# Patient Record
Sex: FEMALE | Race: WHITE | Employment: PART TIME | URBAN - METROPOLITAN AREA
[De-identification: names, ages, dates, MRNs, and addresses within clinical notes are randomized per-mention and may not be internally consistent; named-entity substitution may affect disease eponyms.]

---

## 2018-11-05 ENCOUNTER — HOSPITAL ENCOUNTER (OUTPATIENT)
Facility: MEDICAL CENTER | Age: 33
End: 2018-11-05
Attending: PHYSICIAN ASSISTANT
Payer: COMMERCIAL

## 2018-11-05 PROCEDURE — 88175 CYTOPATH C/V AUTO FLUID REDO: CPT

## 2018-11-05 PROCEDURE — 87624 HPV HI-RISK TYP POOLED RSLT: CPT

## 2018-11-06 LAB
CYTOLOGY REG CYTOL: ABNORMAL
HPV HR 12 DNA CVX QL NAA+PROBE: POSITIVE
HPV16 DNA SPEC QL NAA+PROBE: NEGATIVE
HPV18 DNA SPEC QL NAA+PROBE: NEGATIVE
SPECIMEN SOURCE: ABNORMAL

## 2018-11-18 NOTE — PROGRESS NOTES
Patient referral and medical records reviewed and analyzed 11/18 and 19  Counseling and testing materials prepared    25 minutes    [id number]

## 2018-11-20 ENCOUNTER — OFFICE VISIT (OUTPATIENT)
Dept: HEMATOLOGY ONCOLOGY | Facility: MEDICAL CENTER | Age: 33
End: 2018-11-20
Payer: COMMERCIAL

## 2018-11-20 VITALS
BODY MASS INDEX: 32.75 KG/M2 | RESPIRATION RATE: 16 BRPM | DIASTOLIC BLOOD PRESSURE: 70 MMHG | TEMPERATURE: 98.6 F | OXYGEN SATURATION: 95 % | HEART RATE: 83 BPM | HEIGHT: 63 IN | WEIGHT: 184.86 LBS | SYSTOLIC BLOOD PRESSURE: 100 MMHG

## 2018-11-20 DIAGNOSIS — Z80.3 FAMILY HISTORY OF BREAST CANCER: ICD-10-CM

## 2018-11-20 PROCEDURE — 99241 PR OFFICE CONSULTATION,LEVEL I: CPT | Performed by: MEDICAL GENETICS

## 2018-11-20 ASSESSMENT — PAIN SCALES - GENERAL: PAINLEVEL: NO PAIN

## 2018-11-21 NOTE — PROGRESS NOTES
November 20, 2018  Rossi HOBBS  Re: Phuong Crocker  Dear Ms. Wilson:  I had the opportunity to meet with your patient Phuong Crocker in my office today.  As you remember, Phuong was referred for evaluation and management with a family history of breast cancer (mother, diagnosed at age 39). Phuong’s mother was seen at [facility] and a germline molecular genotype was performed. Phuong brought with her the results of her mother’s test: Variants of Uncertain Significance were found in BRCA2 (c.8633-4T>A) and BARD1 (c.716T>A). There were no pathologic variants found in Phuong’s mother’s test (reported 3/21/16).  As you know, VUS’s are common and, by definition, have not been categorized as to their pathogenicity. They are NOT to be acted on until reclassified. They are, by definition, “Inconclusive.”  I have asked Phuong to request that her mother contact a genetic counselor at [facility] to inquire whether these VUS’s have been reclassified.   No testing for Phuong is indicated at this time. I agree with your management plan for Phuong to include baseline mammography.  Thank you for your support  Sincerely yours,  THO Hyman MD PhD     [id number]

## 2018-12-27 ENCOUNTER — HOSPITAL ENCOUNTER (OUTPATIENT)
Dept: LAB | Facility: MEDICAL CENTER | Age: 33
End: 2018-12-27
Attending: PHYSICIAN ASSISTANT
Payer: COMMERCIAL

## 2018-12-27 LAB — PATHOLOGY CONSULT NOTE: NORMAL

## 2018-12-27 PROCEDURE — 88305 TISSUE EXAM BY PATHOLOGIST: CPT

## 2019-03-15 ENCOUNTER — OFFICE VISIT (OUTPATIENT)
Dept: URGENT CARE | Facility: PHYSICIAN GROUP | Age: 34
End: 2019-03-15
Payer: COMMERCIAL

## 2019-03-15 VITALS
OXYGEN SATURATION: 95 % | BODY MASS INDEX: 33.49 KG/M2 | WEIGHT: 189 LBS | TEMPERATURE: 97.7 F | HEIGHT: 63 IN | SYSTOLIC BLOOD PRESSURE: 102 MMHG | HEART RATE: 74 BPM | DIASTOLIC BLOOD PRESSURE: 72 MMHG | RESPIRATION RATE: 16 BRPM

## 2019-03-15 DIAGNOSIS — N83.201 CYST OF RIGHT OVARY: ICD-10-CM

## 2019-03-15 LAB
APPEARANCE UR: CLEAR
BILIRUB UR STRIP-MCNC: NEGATIVE MG/DL
COLOR UR AUTO: YELLOW
GLUCOSE UR STRIP.AUTO-MCNC: NEGATIVE MG/DL
INT CON NEG: NEGATIVE
INT CON POS: POSITIVE
KETONES UR STRIP.AUTO-MCNC: NEGATIVE MG/DL
LEUKOCYTE ESTERASE UR QL STRIP.AUTO: NEGATIVE
NITRITE UR QL STRIP.AUTO: NEGATIVE
PH UR STRIP.AUTO: 6 [PH] (ref 5–8)
POC URINE PREGNANCY TEST: NEGATIVE
PROT UR QL STRIP: NEGATIVE MG/DL
RBC UR QL AUTO: NEGATIVE
SP GR UR STRIP.AUTO: 1.01
UROBILINOGEN UR STRIP-MCNC: 0.2 MG/DL

## 2019-03-15 PROCEDURE — 99203 OFFICE O/P NEW LOW 30 MIN: CPT | Performed by: PHYSICIAN ASSISTANT

## 2019-03-15 PROCEDURE — 81025 URINE PREGNANCY TEST: CPT | Performed by: PHYSICIAN ASSISTANT

## 2019-03-15 PROCEDURE — 81002 URINALYSIS NONAUTO W/O SCOPE: CPT | Performed by: PHYSICIAN ASSISTANT

## 2019-03-15 NOTE — PROGRESS NOTES
"Chief Complaint   Patient presents with   • RLQ Pain     Lower abdominal pain x2days        HISTORY OF PRESENT ILLNESS: Patient is a 33 y.o. female who presents today for 2 days of RLQ pain of waxing and waning quality. Sometimes dull and sometimes sharp. Positional changes seem to make it better or worse, sitting up being the most painful.   No vaginal bleeding or discharge.  No nausea.  She has had normal appetite.   She has had a  but no other abdominal surgeries.  No urinary symptoms but notes that there was some relief of pain after a BM and after urination.   She and  are trying to get pregnant but are not using anything other than family planning method right now.   OTC with mild relief.     There are no active problems to display for this patient.      Allergies:Patient has no known allergies.    No current [facility]-ordered outpatient prescriptions on file.     No current [facility]-ordered facility-administered medications on file.        History reviewed. No pertinent past medical history.    Social History   Substance Use Topics   • Smoking status: Current Some Day Smoker   • Smokeless tobacco: Never Used   • Alcohol use Yes      Comment: Maikol       No family status information on file.   History reviewed. No pertinent family history.    ROS:  Review of Systems   Constitutional: Negative for fever, chills, weight loss and malaise/fatigue.   HENT: Negative for ear pain, nosebleeds, congestion, sore throat and neck pain.    Eyes: Negative for blurred vision.   Respiratory: Negative for cough, sputum production, shortness of breath and wheezing.    Cardiovascular: Negative for chest pain, palpitations, orthopnea and leg swelling.   Gastrointestinal: SEE HPI  Genitourinary: SEE HPI    Exam:  Blood pressure 102/72, pulse 74, temperature 36.5 °C (97.7 °F), temperature source Temporal, resp. rate 16, height 1.6 m (5' 3\"), weight 85.7 kg (189 lb), SpO2 95 %, not currently breastfeeding.  General:  Well " nourished, well developed female in NAD  Eyes: PERRLA, EOM within normal limits, no conjunctival injection, no scleral icterus, visual fields and acuity grossly intact.  Mouth: reasonable hygiene, no erythema exudates or tonsillar enlargement.  Neck: no masses, range of motion within normal limits, no tracheal deviation. No lymphadenopathy  Pulmonary: Normal respiratory effort, no wheezes, crackles, or rhonchi.  Cardiovascular: regular rate and rhythm without murmurs, rubs, or gallops.  Abdomen: Soft, mild right LQ tender to deep palpation, nondistended. Normal bowel sounds. No hepatosplenomegaly or masses, or hernias. No rebound or guarding.  Negative psoas and obturator signs.   Skin: No visible rashes or lesion. Warm, pink, dry.   Extremities: no clubbing, cyanosis, or edema.  Neuro: A&O x 3. Speech normal/clear.  Normal gait.       Component Results     Component Value Ref Range & Units Status   POC Color YELLOW  Negative Final   POC Appearance CLEAR  Negative Final   POC Leukocyte Esterase NEGATIVE  Negative Final   POC Nitrites NEGATIVE  Negative Final   POC Urobiligen 0.2  Negative (0.2) mg/dL Final   POC Protein NEGATIVE  Negative mg/dL Final   POC Urine PH 6.0  5.0 - 8.0 Final   POC Blood NEGATIVE  Negative Final   POC Specific Gravity 1.010  <1.005 - >1.030 Final   POC Ketones NEGATIVE  Negative mg/dL Final   POC Bilirubin NEGATIVE  Negative mg/dL Final   POC Glucose NEGATIVE  Negative mg/dL      Impression       1.  2 right ovarian cysts which contain internal echoes likely representing hemorrhagic cysts. These measure 4.6 x 3.4 x 3.2 and 3.4 x 3.1 x 3.1 cm in size. Follow-up ultrasound in 6 weeks is recommended for further evaluation.    2.  Small amount of free fluid dependently within the pelvis   Reading Provider Reading Date   Eric Ackerman M.D. Mar 16, 2019       Assessment/Plan:  1. Cyst of right ovary  POCT Pregnancy    POCT Urinalysis    US-PELVIC COMPLETE (TRANSABDOMINAL/TRANSVAGINAL) (COMBO)     REFERRAL TO OB/GYN          -RAD as above.    -speaking with patient following day, 03/16/19 she notes her pain has improved. Suspect regression/resolution naturally however do recommend follow up as does RAD.   -she goes to [facility] and referral to follow up was placed.   -heating pad, NSAIDs prn.   -RTC precautions/ER precautions.         Supportive care, differential diagnoses, and indications for immediate follow-up discussed with patient.   Pathogenesis of diagnosis discussed including typical length and natural progression.   Instructed to return to clinic or nearest emergency department for any change in condition, further concerns, or worsening of symptoms.  Patient states understanding of the plan of care and discharge instructions.        Divine Castillo P.A.-C.

## 2019-03-16 ENCOUNTER — HOSPITAL ENCOUNTER (OUTPATIENT)
Dept: RADIOLOGY | Facility: MEDICAL CENTER | Age: 34
End: 2019-03-16
Attending: PHYSICIAN ASSISTANT
Payer: COMMERCIAL

## 2019-03-16 DIAGNOSIS — R10.31 RLQ ABDOMINAL PAIN: ICD-10-CM

## 2019-03-16 PROCEDURE — 76830 TRANSVAGINAL US NON-OB: CPT

## 2019-04-06 ENCOUNTER — HOSPITAL ENCOUNTER (OUTPATIENT)
Dept: RADIOLOGY | Facility: MEDICAL CENTER | Age: 34
End: 2019-04-06
Attending: PHYSICIAN ASSISTANT
Payer: COMMERCIAL

## 2019-04-06 DIAGNOSIS — Z80.3 FAMILY HISTORY OF MALIGNANT NEOPLASM OF BREAST: ICD-10-CM

## 2019-04-06 DIAGNOSIS — Z12.31 VISIT FOR SCREENING MAMMOGRAM: ICD-10-CM

## 2019-04-25 ENCOUNTER — HOSPITAL ENCOUNTER (OUTPATIENT)
Dept: RADIOLOGY | Facility: MEDICAL CENTER | Age: 34
End: 2019-04-25
Attending: PHYSICIAN ASSISTANT
Payer: COMMERCIAL

## 2019-04-25 PROCEDURE — 77063 BREAST TOMOSYNTHESIS BI: CPT

## 2019-05-04 ENCOUNTER — HOSPITAL ENCOUNTER (OUTPATIENT)
Dept: RADIOLOGY | Facility: MEDICAL CENTER | Age: 34
End: 2019-05-04
Attending: OBSTETRICS & GYNECOLOGY
Payer: COMMERCIAL

## 2019-05-04 DIAGNOSIS — N83.201 CYST OF RIGHT OVARY: ICD-10-CM

## 2019-05-04 PROCEDURE — 76830 TRANSVAGINAL US NON-OB: CPT

## 2019-05-21 ENCOUNTER — OFFICE VISIT (OUTPATIENT)
Dept: URGENT CARE | Facility: PHYSICIAN GROUP | Age: 34
End: 2019-05-21
Payer: COMMERCIAL

## 2019-05-21 VITALS
HEIGHT: 63 IN | RESPIRATION RATE: 18 BRPM | OXYGEN SATURATION: 98 % | WEIGHT: 185 LBS | TEMPERATURE: 97.5 F | SYSTOLIC BLOOD PRESSURE: 132 MMHG | BODY MASS INDEX: 32.78 KG/M2 | HEART RATE: 82 BPM | DIASTOLIC BLOOD PRESSURE: 82 MMHG

## 2019-05-21 DIAGNOSIS — N61.0 MASTITIS: ICD-10-CM

## 2019-05-21 PROCEDURE — 99214 OFFICE O/P EST MOD 30 MIN: CPT | Performed by: FAMILY MEDICINE

## 2019-05-21 RX ORDER — AMOXICILLIN AND CLAVULANATE POTASSIUM 875; 125 MG/1; MG/1
1 TABLET, FILM COATED ORAL 2 TIMES DAILY
Qty: 14 TAB | Refills: 0 | Status: SHIPPED | OUTPATIENT
Start: 2019-05-21 | End: 2019-05-28

## 2019-05-25 NOTE — PROGRESS NOTES
"SUBJECTIVE      Chief Complaint   Patient presents with   • Nodule     R [id number]                Rash  This is a new problem. The problem has been gradually worsening since onset. Pain location: rt breast - 2 d ago.     The rash is characterized by redness, swelling and pain.   Area is TTP.    Pain is \"soreness\" and \"constant\".     Pt was exposed to nothing. Pertinent negatives include no congestion, cough, fatigue, fever or shortness of breath. Past treatments include nothing.     History   Substance Use Topics   • Smoking status: Never Smoker    • Smokeless tobacco: No    • Alcohol Use: No      Past medical history was unremarkable and not pertinent to current issue       Family history was reviewed and not pertinent           Review of Systems   Constitutional: Negative for fever, chills and malaise/fatigue.   Eyes: Negative for vision changes, d/c.    Respiratory: Negative for cough and sputum production.    Cardiovascular: Negative for chest pain and palpitations.   Gastrointestinal: Negative for nausea, vomiting, abdominal pain, diarrhea and constipation.   Genitourinary: Negative for dysuria, urgency and frequency.   Skin: Negative for rash or  itching.   Neurological: Negative for dizziness and tingling.   Psychiatric/Behavioral: Negative for depression.   Hematologic/lymphatic - denies bruising or excessive bleeding  All other systems reviewed and are negative.            Objective:     /82   Pulse 82   Temp 36.4 °C (97.5 °F) (Temporal)   Resp 18   Ht 1.6 m (5' 3\")   Wt 83.9 kg (185 lb)   SpO2 98%       Physical Exam   Constitutional: pt is oriented to person, place, and time. Pt appears well-developed and well-nourished. No distress.   HENT:   Head: Normocephalic and atraumatic.   Eyes: Conjunctivae are normal. No scleral icterus.   Cardiovascular: Normal rate and regular rhythm.    Pulmonary/Chest: Effort normal and breath sounds normal. No respiratory distress.    rt breast - there is 2 " x3 area of redness, warmth and TTP at outer, lower quad    Neurological: pt is alert and oriented to person, place, and time. No cranial nerve deficit.   Skin: Skin is warm. Pt is not diaphoretic.      Area of erythema, warmth, tender to touch over       Nursing note and vitals reviewed.              Assessment/Plan:     1. Mastitis     - amoxicillin-clavulanate (AUGMENTIN) 875-125 MG Tab; Take 1 Tab by mouth 2 times a day for 7 days.  Dispense: 14 Tab; Refill: 0    Follow up in one week if no improvement, sooner if symptoms worsen.

## 2019-06-14 ENCOUNTER — TELEPHONE (OUTPATIENT)
Dept: RADIOLOGY | Facility: MEDICAL CENTER | Age: 34
End: 2019-06-14

## 2019-06-14 NOTE — TELEPHONE ENCOUNTER
LM REQUESTING REVISED ORDER FOR ULTRASOUND AND/OR DIAGNOSTIC MAMMOGRAM (IF INDICATED); CURRENTLY HAVE US ORDER ONLY/TML

## 2019-06-17 ENCOUNTER — HOSPITAL ENCOUNTER (OUTPATIENT)
Dept: RADIOLOGY | Facility: MEDICAL CENTER | Age: 34
End: 2019-06-17
Attending: PHYSICIAN ASSISTANT
Payer: COMMERCIAL

## 2019-06-17 DIAGNOSIS — N64.9 BREAST LESION: ICD-10-CM

## 2019-06-17 PROCEDURE — 76642 ULTRASOUND BREAST LIMITED: CPT | Mod: RT

## 2019-07-12 ENCOUNTER — TELEPHONE (OUTPATIENT)
Dept: RADIOLOGY | Facility: MEDICAL CENTER | Age: 34
End: 2019-07-12

## 2019-07-12 NOTE — TELEPHONE ENCOUNTER
LM TO ADVISE WE ARE WAITING FOR CORRECTED ORDER FROM DR GHADA BROUSSARD OFFICE; ORIGINALLY SENT BILAT MARITAG/US; PT NEEDS UNI R DIAG W/US/TML

## 2019-07-15 ENCOUNTER — HOSPITAL ENCOUNTER (OUTPATIENT)
Dept: RADIOLOGY | Facility: MEDICAL CENTER | Age: 34
End: 2019-07-15
Attending: PHYSICIAN ASSISTANT
Payer: COMMERCIAL

## 2019-07-15 DIAGNOSIS — N64.9 LESION OF BREAST: ICD-10-CM

## 2019-07-15 PROCEDURE — 76642 ULTRASOUND BREAST LIMITED: CPT | Mod: RT

## 2019-07-16 ENCOUNTER — HOSPITAL ENCOUNTER (OUTPATIENT)
Dept: LAB | Facility: MEDICAL CENTER | Age: 34
End: 2019-07-16
Attending: PHYSICIAN ASSISTANT
Payer: COMMERCIAL

## 2019-07-16 PROCEDURE — 88175 CYTOPATH C/V AUTO FLUID REDO: CPT

## 2019-07-16 PROCEDURE — 87624 HPV HI-RISK TYP POOLED RSLT: CPT

## 2020-01-24 ENCOUNTER — HOSPITAL ENCOUNTER (OUTPATIENT)
Dept: LAB | Facility: MEDICAL CENTER | Age: 35
End: 2020-01-24
Attending: PHYSICIAN ASSISTANT
Payer: COMMERCIAL

## 2020-01-24 PROCEDURE — 88175 CYTOPATH C/V AUTO FLUID REDO: CPT

## 2020-01-24 PROCEDURE — 87624 HPV HI-RISK TYP POOLED RSLT: CPT

## 2020-03-10 ENCOUNTER — HOSPITAL ENCOUNTER (OUTPATIENT)
Dept: LAB | Facility: MEDICAL CENTER | Age: 35
End: 2020-03-10
Attending: PHYSICIAN ASSISTANT
Payer: COMMERCIAL

## 2020-03-10 LAB — PATHOLOGY CONSULT NOTE: NORMAL

## 2020-03-10 PROCEDURE — 88305 TISSUE EXAM BY PATHOLOGIST: CPT | Mod: 59

## 2020-08-07 ENCOUNTER — OFFICE VISIT (OUTPATIENT)
Dept: URGENT CARE | Facility: PHYSICIAN GROUP | Age: 35
End: 2020-08-07
Payer: COMMERCIAL

## 2020-08-07 VITALS
TEMPERATURE: 98.1 F | DIASTOLIC BLOOD PRESSURE: 80 MMHG | RESPIRATION RATE: 14 BRPM | OXYGEN SATURATION: 97 % | HEART RATE: 67 BPM | WEIGHT: 178 LBS | HEIGHT: 63 IN | BODY MASS INDEX: 31.54 KG/M2 | SYSTOLIC BLOOD PRESSURE: 128 MMHG
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
## 2020-08-07 PROCEDURE — 99214 OFFICE O/P EST MOD 30 MIN: CPT | Performed by: PHYSICIAN ASSISTANT

## 2020-08-07 RX ORDER — METHYLPREDNISOLONE 4 MG/1
TABLET ORAL
Qty: 21 TAB | Refills: 0 | Status: SHIPPED | OUTPATIENT
Start: 2020-08-07 | End: 2022-11-30

## 2020-08-07 ASSESSMENT — ENCOUNTER SYMPTOMS
HEADACHES: 0
VOMITING: 0
SORE THROAT: 0
EYE REDNESS: 0
NAUSEA: 0
EYE DISCHARGE: 0
COUGH: 0
SHORTNESS OF BREATH: 0
FEVER: 0

## 2020-08-07 NOTE — PROGRESS NOTES
Subjective:      Tina Crocker is a 34 y.o. female who presents with Bug Bite (on 8/04/20  swollen and tingling in arm )            HPI  This is a new problem.   The patient presents to clinic c/o a bug bite to her right upper arm.  The patient states she was in her backyard x2 days ago when she sustained a bug bite to her right upper arm.  The patient is unsure of the type of bug/insect that caused the pain.  The patient states she developed a red welt to the affected area with associated itching.  The patient states the next day she developed increased swelling and redness to the affected area of the right upper arm.  The patient was concerned about possible infection.  The patient states she started taking leftover doxycycline for her current symptoms.  The patient states she took approximately 4 doses of the left upper antibiotic.  Patient states the swelling and redness improved.  The patient reports continued itching to the affected area.  She reports no associated fever.  No discharge/drainage.  The patient has taken Benadryl for her current symptoms.    PMH:  has no past medical history on file.  MEDS: No current outpatient medications on file.  ALLERGIES: Not on File  SURGHX: No past surgical history on file.  SOCHX:  reports that she has been smoking cigarettes. She has never used smokeless tobacco. She reports current alcohol use. She reports that she does not use drugs.  FH: Family history was reviewed, no pertinent findings to report    Review of Systems   Constitutional: Negative for fever.   HENT: Negative for congestion, ear pain and sore throat.    Eyes: Negative for discharge and redness.   Respiratory: Negative for cough and shortness of breath.    Cardiovascular: Negative for chest pain and leg swelling.   Gastrointestinal: Negative for nausea and vomiting.   Musculoskeletal: Negative for joint pain.   Skin: Negative for rash.   Neurological: Negative for headaches.          Objective:     BP  "128/80 (BP Location: Left arm, Patient Position: Sitting, BP Cuff Size: Adult)   Pulse 67   Temp 36.7 °C (98.1 °F) (Temporal)   Resp 14   Ht 1.6 m (5' 3\")   Wt 80.7 kg (178 lb)   SpO2 97%   BMI 31.53 kg/m²      Physical Exam  Constitutional:       General: She is not in acute distress.     Appearance: Normal appearance. She is well-developed.   HENT:      Head: Normocephalic and atraumatic.      Right Ear: External ear normal.      Left Ear: External ear normal.      Nose: Nose normal.   Eyes:      Extraocular Movements: Extraocular movements intact.      Conjunctiva/sclera: Conjunctivae normal.   Neck:      Musculoskeletal: Normal range of motion and neck supple.   Cardiovascular:      Rate and Rhythm: Normal rate.   Pulmonary:      Effort: Pulmonary effort is normal.   Musculoskeletal: Normal range of motion.   Skin:     General: Skin is warm and dry.             Comments:   Right Upper Arm:  Possible insect bite to the medial aspect of the right upper arm with 2 punctate lesions. No tenderness to palpation.  No swelling.  No overlying erythema.  No increased warmth.  No ecchymosis.  No induration.  No palpable fluctuance.  No signs of lymphangitis.  No discharge/drainage.   Neurological:      Mental Status: She is alert and oriented to person, place, and time.                 Assessment/Plan:        1. Bug bite, initial encounter  - methylPREDNISolone (MEDROL DOSEPAK) 4 MG Tablet Therapy Pack; Follow schedule on package instructions.  Dispense: 21 Tab; Refill: 0    The patient's presenting symptoms and physical exam findings are consistent with a bug bite of the right upper extremity.  On physical exam, patient had a possible insect bite to the medial aspect of the right upper arm with 2 punching lesions.  No tenderness to palpation, swelling, overlying erythema, increased warmth, ecchymosis, induration, palpable fluctuance, discharge/drainage, or signs of lymphangitis were appreciated.  The patient " reports continued itching to the area.  Discussed likely inflammatory response versus infectious process with the patient.  Advised the patient her symptoms are likely due to inflammatory process.  Informed the patient to discontinue the Doxycycline.  Will prescribe patient a Medrol Dosepak for her current symptoms.  Advised the patient of the associated side effects of oral steroids, including immunosuppression.  Recommend OTC medications and supportive care for symptomatic management.  Recommend patient follow-up with her PCP.  Discussed return precautions with the patient, and she verbalized understanding.    Differential diagnoses, supportive care, and indications for immediate follow-up discussed with patient.   Instructed to return to clinic or nearest emergency department for any change in condition, further concerns, or worsening of symptoms.    OTC Tylenol or Motrin for fever/discomfort.  OTC antihistamines for symptomatic relief  OTC anti-itch cream for symptomatic relief  OTC topical steroid cream for symptomatic relief  Monitor for signs of infection  Follow-up with PCP  Return to clinic or go to the ED if symptoms worsen or fail to improve, or if patient develop worsening/increasing/persistent pain/tenderness to the affected area, swelling, increased redness or warmth, discharge/drainage, increased itching, radiation of pain, numbness, tingling, weakness, fever/chills, secondary signs of infection, and/or any concerning symptoms.    Discussed plan with the patient, and she agrees to the above.     Please note that this dictation was created using voice recognition software. I have made every reasonable attempt to correct obvious errors, but I expect that there may be errors of grammar and possibly content that I did not discover before finalizing the note.

## 2020-09-21 ENCOUNTER — HOSPITAL ENCOUNTER (OUTPATIENT)
Dept: LAB | Facility: MEDICAL CENTER | Age: 35
End: 2020-09-21
Attending: PHYSICIAN ASSISTANT
Payer: COMMERCIAL

## 2020-09-21 PROCEDURE — 88175 CYTOPATH C/V AUTO FLUID REDO: CPT

## 2020-09-21 PROCEDURE — 87624 HPV HI-RISK TYP POOLED RSLT: CPT

## 2020-12-03 ENCOUNTER — HOSPITAL ENCOUNTER (OUTPATIENT)
Dept: LAB | Facility: MEDICAL CENTER | Age: 35
End: 2020-12-03
Attending: OBSTETRICS & GYNECOLOGY
Payer: COMMERCIAL

## 2020-12-03 LAB — PATHOLOGY CONSULT NOTE: NORMAL

## 2020-12-03 PROCEDURE — 88307 TISSUE EXAM BY PATHOLOGIST: CPT | Mod: 59

## 2021-02-04 ENCOUNTER — HOSPITAL ENCOUNTER (OUTPATIENT)
Dept: RADIOLOGY | Facility: MEDICAL CENTER | Age: 36
End: 2021-02-04
Attending: PHYSICIAN ASSISTANT
Payer: COMMERCIAL

## 2021-02-04 DIAGNOSIS — N83.209 CYST OF OVARY, UNSPECIFIED LATERALITY: ICD-10-CM

## 2021-02-04 PROCEDURE — 76830 TRANSVAGINAL US NON-OB: CPT

## 2021-06-16 ENCOUNTER — HOSPITAL ENCOUNTER (OUTPATIENT)
Facility: MEDICAL CENTER | Age: 36
End: 2021-06-16
Attending: PHYSICIAN ASSISTANT
Payer: COMMERCIAL

## 2021-06-16 PROCEDURE — 88175 CYTOPATH C/V AUTO FLUID REDO: CPT

## 2021-06-16 PROCEDURE — 87624 HPV HI-RISK TYP POOLED RSLT: CPT

## 2021-06-17 LAB
CYTOLOGY REG CYTOL: NORMAL
HPV HR 12 DNA CVX QL NAA+PROBE: NEGATIVE
HPV16 DNA SPEC QL NAA+PROBE: NEGATIVE
HPV18 DNA SPEC QL NAA+PROBE: NEGATIVE
SPECIMEN SOURCE: NORMAL

## 2022-07-05 ENCOUNTER — HOSPITAL ENCOUNTER (OUTPATIENT)
Dept: LAB | Facility: MEDICAL CENTER | Age: 37
End: 2022-07-05
Attending: PHYSICIAN ASSISTANT
Payer: COMMERCIAL

## 2022-07-05 PROCEDURE — 88175 CYTOPATH C/V AUTO FLUID REDO: CPT

## 2022-07-07 LAB — CYTOLOGY REG CYTOL: NORMAL

## 2022-11-30 ENCOUNTER — PRE-ADMISSION TESTING (OUTPATIENT)
Dept: ADMISSIONS | Facility: MEDICAL CENTER | Age: 37
End: 2022-11-30
Attending: OBSTETRICS & GYNECOLOGY
Payer: COMMERCIAL

## 2022-11-30 DIAGNOSIS — Z01.812 PRE-OPERATIVE LABORATORY EXAMINATION: ICD-10-CM

## 2022-11-30 LAB
ANION GAP SERPL CALC-SCNC: 14 MMOL/L (ref 7–16)
B-HCG SERPL-ACNC: <1 MIU/ML (ref 0–5)
BUN SERPL-MCNC: 14 MG/DL (ref 8–22)
CALCIUM SERPL-MCNC: 10.3 MG/DL (ref 8.5–10.5)
CHLORIDE SERPL-SCNC: 103 MMOL/L (ref 96–112)
CO2 SERPL-SCNC: 25 MMOL/L (ref 20–33)
CREAT SERPL-MCNC: 0.83 MG/DL (ref 0.5–1.4)
ERYTHROCYTE [DISTWIDTH] IN BLOOD BY AUTOMATED COUNT: 43 FL (ref 35.9–50)
GFR SERPLBLD CREATININE-BSD FMLA CKD-EPI: 93 ML/MIN/1.73 M 2
GLUCOSE SERPL-MCNC: 83 MG/DL (ref 65–99)
HCT VFR BLD AUTO: 41.1 % (ref 37–47)
HGB BLD-MCNC: 13.5 G/DL (ref 12–16)
MCH RBC QN AUTO: 29.3 PG (ref 27–33)
MCHC RBC AUTO-ENTMCNC: 32.8 G/DL (ref 33.6–35)
MCV RBC AUTO: 89.3 FL (ref 81.4–97.8)
PLATELET # BLD AUTO: 214 K/UL (ref 164–446)
PMV BLD AUTO: 13.5 FL (ref 9–12.9)
POTASSIUM SERPL-SCNC: 4 MMOL/L (ref 3.6–5.5)
RBC # BLD AUTO: 4.6 M/UL (ref 4.2–5.4)
SODIUM SERPL-SCNC: 142 MMOL/L (ref 135–145)
WBC # BLD AUTO: 8.3 K/UL (ref 4.8–10.8)

## 2022-11-30 PROCEDURE — 80048 BASIC METABOLIC PNL TOTAL CA: CPT

## 2022-11-30 PROCEDURE — 36415 COLL VENOUS BLD VENIPUNCTURE: CPT

## 2022-11-30 PROCEDURE — 85027 COMPLETE CBC AUTOMATED: CPT

## 2022-11-30 PROCEDURE — 84702 CHORIONIC GONADOTROPIN TEST: CPT

## 2022-12-09 ENCOUNTER — ANESTHESIA EVENT (OUTPATIENT)
Dept: SURGERY | Facility: MEDICAL CENTER | Age: 37
End: 2022-12-09
Payer: COMMERCIAL

## 2022-12-09 ENCOUNTER — HOSPITAL ENCOUNTER (OUTPATIENT)
Facility: MEDICAL CENTER | Age: 37
End: 2022-12-09
Attending: OBSTETRICS & GYNECOLOGY | Admitting: OBSTETRICS & GYNECOLOGY
Payer: COMMERCIAL

## 2022-12-09 ENCOUNTER — ANESTHESIA (OUTPATIENT)
Dept: SURGERY | Facility: MEDICAL CENTER | Age: 37
End: 2022-12-09
Payer: COMMERCIAL

## 2022-12-09 VITALS
BODY MASS INDEX: 30.08 KG/M2 | SYSTOLIC BLOOD PRESSURE: 116 MMHG | OXYGEN SATURATION: 94 % | TEMPERATURE: 97.4 F | RESPIRATION RATE: 16 BRPM | HEIGHT: 63 IN | WEIGHT: 169.75 LBS | DIASTOLIC BLOOD PRESSURE: 72 MMHG | HEART RATE: 67 BPM

## 2022-12-09 DIAGNOSIS — R11.0 POSTOPERATIVE NAUSEA: ICD-10-CM

## 2022-12-09 DIAGNOSIS — G89.18 POSTOPERATIVE PAIN: ICD-10-CM

## 2022-12-09 DIAGNOSIS — Z98.890 POSTOPERATIVE NAUSEA: ICD-10-CM

## 2022-12-09 PROBLEM — R10.2 PELVIC PAIN: Status: ACTIVE | Noted: 2022-12-09

## 2022-12-09 PROBLEM — N97.1: Status: ACTIVE | Noted: 2022-12-09

## 2022-12-09 PROBLEM — N73.6 PELVIC PERITONEAL ADHESIONS, FEMALE: Status: ACTIVE | Noted: 2022-12-09

## 2022-12-09 LAB — HCG UR QL: NEGATIVE

## 2022-12-09 PROCEDURE — 160009 HCHG ANES TIME/MIN: Performed by: OBSTETRICS & GYNECOLOGY

## 2022-12-09 PROCEDURE — 700102 HCHG RX REV CODE 250 W/ 637 OVERRIDE(OP): Performed by: OBSTETRICS & GYNECOLOGY

## 2022-12-09 PROCEDURE — 160035 HCHG PACU - 1ST 60 MINS PHASE I: Performed by: OBSTETRICS & GYNECOLOGY

## 2022-12-09 PROCEDURE — 160048 HCHG OR STATISTICAL LEVEL 1-5: Performed by: OBSTETRICS & GYNECOLOGY

## 2022-12-09 PROCEDURE — 700111 HCHG RX REV CODE 636 W/ 250 OVERRIDE (IP): Performed by: ANESTHESIOLOGY

## 2022-12-09 PROCEDURE — 00840 ANES IPER PX LOWER ABD NOS: CPT | Performed by: ANESTHESIOLOGY

## 2022-12-09 PROCEDURE — 160036 HCHG PACU - EA ADDL 30 MINS PHASE I: Performed by: OBSTETRICS & GYNECOLOGY

## 2022-12-09 PROCEDURE — A9270 NON-COVERED ITEM OR SERVICE: HCPCS | Performed by: OBSTETRICS & GYNECOLOGY

## 2022-12-09 PROCEDURE — 700101 HCHG RX REV CODE 250: Performed by: OBSTETRICS & GYNECOLOGY

## 2022-12-09 PROCEDURE — 160046 HCHG PACU - 1ST 60 MINS PHASE II: Performed by: OBSTETRICS & GYNECOLOGY

## 2022-12-09 PROCEDURE — 160025 RECOVERY II MINUTES (STATS): Performed by: OBSTETRICS & GYNECOLOGY

## 2022-12-09 PROCEDURE — 160028 HCHG SURGERY MINUTES - 1ST 30 MINS LEVEL 3: Performed by: OBSTETRICS & GYNECOLOGY

## 2022-12-09 PROCEDURE — 81025 URINE PREGNANCY TEST: CPT

## 2022-12-09 PROCEDURE — 700101 HCHG RX REV CODE 250: Performed by: ANESTHESIOLOGY

## 2022-12-09 PROCEDURE — 160039 HCHG SURGERY MINUTES - EA ADDL 1 MIN LEVEL 3: Performed by: OBSTETRICS & GYNECOLOGY

## 2022-12-09 PROCEDURE — 160002 HCHG RECOVERY MINUTES (STAT): Performed by: OBSTETRICS & GYNECOLOGY

## 2022-12-09 PROCEDURE — 700105 HCHG RX REV CODE 258: Performed by: OBSTETRICS & GYNECOLOGY

## 2022-12-09 PROCEDURE — 160047 HCHG PACU  - EA ADDL 30 MINS PHASE II: Performed by: OBSTETRICS & GYNECOLOGY

## 2022-12-09 RX ORDER — CELECOXIB 200 MG/1
400 CAPSULE ORAL ONCE
Status: COMPLETED | OUTPATIENT
Start: 2022-12-09 | End: 2022-12-09

## 2022-12-09 RX ORDER — PROMETHAZINE HYDROCHLORIDE 25 MG/1
12.5 SUPPOSITORY RECTAL EVERY 4 HOURS PRN
Status: DISCONTINUED | OUTPATIENT
Start: 2022-12-09 | End: 2022-12-09 | Stop reason: HOSPADM

## 2022-12-09 RX ORDER — HYDROMORPHONE HYDROCHLORIDE 1 MG/ML
0.2 INJECTION, SOLUTION INTRAMUSCULAR; INTRAVENOUS; SUBCUTANEOUS
Status: DISCONTINUED | OUTPATIENT
Start: 2022-12-09 | End: 2022-12-09 | Stop reason: HOSPADM

## 2022-12-09 RX ORDER — OXYCODONE HCL 5 MG/5 ML
10 SOLUTION, ORAL ORAL
Status: DISCONTINUED | OUTPATIENT
Start: 2022-12-09 | End: 2022-12-09 | Stop reason: HOSPADM

## 2022-12-09 RX ORDER — ACETAMINOPHEN 500 MG
1000 TABLET ORAL ONCE
Status: COMPLETED | OUTPATIENT
Start: 2022-12-09 | End: 2022-12-09

## 2022-12-09 RX ORDER — EPINEPHRINE 1 MG/ML(1)
AMPUL (ML) INJECTION
Status: DISCONTINUED
Start: 2022-12-09 | End: 2022-12-09 | Stop reason: HOSPADM

## 2022-12-09 RX ORDER — METOPROLOL TARTRATE 1 MG/ML
1 INJECTION, SOLUTION INTRAVENOUS
Status: DISCONTINUED | OUTPATIENT
Start: 2022-12-09 | End: 2022-12-09 | Stop reason: HOSPADM

## 2022-12-09 RX ORDER — PHENAZOPYRIDINE HYDROCHLORIDE 200 MG/1
200 TABLET, FILM COATED ORAL ONCE
Status: COMPLETED | OUTPATIENT
Start: 2022-12-09 | End: 2022-12-09

## 2022-12-09 RX ORDER — MIDAZOLAM HYDROCHLORIDE 1 MG/ML
INJECTION INTRAMUSCULAR; INTRAVENOUS PRN
Status: DISCONTINUED | OUTPATIENT
Start: 2022-12-09 | End: 2022-12-09 | Stop reason: SURG

## 2022-12-09 RX ORDER — ONDANSETRON 2 MG/ML
INJECTION INTRAMUSCULAR; INTRAVENOUS PRN
Status: DISCONTINUED | OUTPATIENT
Start: 2022-12-09 | End: 2022-12-09 | Stop reason: SURG

## 2022-12-09 RX ORDER — HYDROMORPHONE HYDROCHLORIDE 1 MG/ML
0.1 INJECTION, SOLUTION INTRAMUSCULAR; INTRAVENOUS; SUBCUTANEOUS
Status: DISCONTINUED | OUTPATIENT
Start: 2022-12-09 | End: 2022-12-09 | Stop reason: HOSPADM

## 2022-12-09 RX ORDER — BUPIVACAINE HYDROCHLORIDE AND EPINEPHRINE 2.5; 5 MG/ML; UG/ML
INJECTION, SOLUTION EPIDURAL; INFILTRATION; INTRACAUDAL; PERINEURAL
Status: DISCONTINUED | OUTPATIENT
Start: 2022-12-09 | End: 2022-12-09 | Stop reason: HOSPADM

## 2022-12-09 RX ORDER — ONDANSETRON 4 MG/1
4 TABLET, ORALLY DISINTEGRATING ORAL EVERY 6 HOURS PRN
Qty: 4 TABLET | Refills: 0 | Status: SHIPPED
Start: 2022-12-09 | End: 2022-12-10

## 2022-12-09 RX ORDER — GABAPENTIN 300 MG/1
600 CAPSULE ORAL ONCE
Status: COMPLETED | OUTPATIENT
Start: 2022-12-09 | End: 2022-12-09

## 2022-12-09 RX ORDER — HALOPERIDOL 5 MG/ML
1 INJECTION INTRAMUSCULAR
Status: DISCONTINUED | OUTPATIENT
Start: 2022-12-09 | End: 2022-12-09 | Stop reason: HOSPADM

## 2022-12-09 RX ORDER — LIDOCAINE HYDROCHLORIDE 20 MG/ML
INJECTION, SOLUTION EPIDURAL; INFILTRATION; INTRACAUDAL; PERINEURAL PRN
Status: DISCONTINUED | OUTPATIENT
Start: 2022-12-09 | End: 2022-12-09 | Stop reason: SURG

## 2022-12-09 RX ORDER — KETOROLAC TROMETHAMINE 30 MG/ML
INJECTION, SOLUTION INTRAMUSCULAR; INTRAVENOUS PRN
Status: DISCONTINUED | OUTPATIENT
Start: 2022-12-09 | End: 2022-12-09 | Stop reason: SURG

## 2022-12-09 RX ORDER — CEFAZOLIN SODIUM 1 G/3ML
INJECTION, POWDER, FOR SOLUTION INTRAMUSCULAR; INTRAVENOUS PRN
Status: DISCONTINUED | OUTPATIENT
Start: 2022-12-09 | End: 2022-12-09 | Stop reason: SURG

## 2022-12-09 RX ORDER — HYDRALAZINE HYDROCHLORIDE 20 MG/ML
5 INJECTION INTRAMUSCULAR; INTRAVENOUS
Status: DISCONTINUED | OUTPATIENT
Start: 2022-12-09 | End: 2022-12-09 | Stop reason: HOSPADM

## 2022-12-09 RX ORDER — BUPIVACAINE HYDROCHLORIDE 2.5 MG/ML
INJECTION, SOLUTION EPIDURAL; INFILTRATION; INTRACAUDAL
Status: DISCONTINUED
Start: 2022-12-09 | End: 2022-12-09 | Stop reason: HOSPADM

## 2022-12-09 RX ORDER — OXYCODONE HCL 5 MG/5 ML
5 SOLUTION, ORAL ORAL
Status: DISCONTINUED | OUTPATIENT
Start: 2022-12-09 | End: 2022-12-09 | Stop reason: HOSPADM

## 2022-12-09 RX ORDER — DEXAMETHASONE SODIUM PHOSPHATE 4 MG/ML
INJECTION, SOLUTION INTRA-ARTICULAR; INTRALESIONAL; INTRAMUSCULAR; INTRAVENOUS; SOFT TISSUE PRN
Status: DISCONTINUED | OUTPATIENT
Start: 2022-12-09 | End: 2022-12-09 | Stop reason: SURG

## 2022-12-09 RX ORDER — ONDANSETRON 2 MG/ML
4 INJECTION INTRAMUSCULAR; INTRAVENOUS
Status: DISCONTINUED | OUTPATIENT
Start: 2022-12-09 | End: 2022-12-09 | Stop reason: HOSPADM

## 2022-12-09 RX ORDER — ROCURONIUM BROMIDE 10 MG/ML
INJECTION, SOLUTION INTRAVENOUS PRN
Status: DISCONTINUED | OUTPATIENT
Start: 2022-12-09 | End: 2022-12-09 | Stop reason: SURG

## 2022-12-09 RX ORDER — SODIUM CHLORIDE, SODIUM LACTATE, POTASSIUM CHLORIDE, CALCIUM CHLORIDE 600; 310; 30; 20 MG/100ML; MG/100ML; MG/100ML; MG/100ML
INJECTION, SOLUTION INTRAVENOUS CONTINUOUS
Status: ACTIVE | OUTPATIENT
Start: 2022-12-09 | End: 2022-12-09

## 2022-12-09 RX ORDER — TRAMADOL HYDROCHLORIDE 50 MG/1
50 TABLET ORAL EVERY 6 HOURS PRN
Qty: 8 TABLET | Refills: 0 | Status: SHIPPED | OUTPATIENT
Start: 2022-12-09 | End: 2022-12-11

## 2022-12-09 RX ORDER — DIPHENHYDRAMINE HYDROCHLORIDE 50 MG/ML
12.5 INJECTION INTRAMUSCULAR; INTRAVENOUS
Status: DISCONTINUED | OUTPATIENT
Start: 2022-12-09 | End: 2022-12-09 | Stop reason: HOSPADM

## 2022-12-09 RX ORDER — HYDROMORPHONE HYDROCHLORIDE 1 MG/ML
0.4 INJECTION, SOLUTION INTRAMUSCULAR; INTRAVENOUS; SUBCUTANEOUS
Status: DISCONTINUED | OUTPATIENT
Start: 2022-12-09 | End: 2022-12-09 | Stop reason: HOSPADM

## 2022-12-09 RX ADMIN — KETOROLAC TROMETHAMINE 30 MG: 30 INJECTION, SOLUTION INTRAMUSCULAR at 12:33

## 2022-12-09 RX ADMIN — SUGAMMADEX 200 MG: 100 INJECTION, SOLUTION INTRAVENOUS at 13:49

## 2022-12-09 RX ADMIN — CELECOXIB 400 MG: 200 CAPSULE ORAL at 11:06

## 2022-12-09 RX ADMIN — MIDAZOLAM HYDROCHLORIDE 2 MG: 1 INJECTION, SOLUTION INTRAMUSCULAR; INTRAVENOUS at 12:22

## 2022-12-09 RX ADMIN — FENTANYL CITRATE 100 MCG: 50 INJECTION, SOLUTION INTRAMUSCULAR; INTRAVENOUS at 12:44

## 2022-12-09 RX ADMIN — PHENAZOPYRIDINE 200 MG: 200 TABLET ORAL at 11:06

## 2022-12-09 RX ADMIN — GABAPENTIN 600 MG: 300 CAPSULE ORAL at 11:06

## 2022-12-09 RX ADMIN — ONDANSETRON 4 MG: 2 INJECTION INTRAMUSCULAR; INTRAVENOUS at 12:33

## 2022-12-09 RX ADMIN — SODIUM CHLORIDE, POTASSIUM CHLORIDE, SODIUM LACTATE AND CALCIUM CHLORIDE: 600; 310; 30; 20 INJECTION, SOLUTION INTRAVENOUS at 12:22

## 2022-12-09 RX ADMIN — FENTANYL CITRATE 100 MCG: 50 INJECTION, SOLUTION INTRAMUSCULAR; INTRAVENOUS at 12:25

## 2022-12-09 RX ADMIN — ROCURONIUM BROMIDE 50 MG: 10 INJECTION, SOLUTION INTRAVENOUS at 12:25

## 2022-12-09 RX ADMIN — CEFAZOLIN 2 G: 330 INJECTION, POWDER, FOR SOLUTION INTRAMUSCULAR; INTRAVENOUS at 12:27

## 2022-12-09 RX ADMIN — LIDOCAINE HYDROCHLORIDE 50 MG: 20 INJECTION, SOLUTION EPIDURAL; INFILTRATION; INTRACAUDAL at 12:25

## 2022-12-09 RX ADMIN — ACETAMINOPHEN 1000 MG: 500 TABLET ORAL at 11:06

## 2022-12-09 RX ADMIN — DEXAMETHASONE SODIUM PHOSPHATE 4 MG: 4 INJECTION, SOLUTION INTRA-ARTICULAR; INTRALESIONAL; INTRAMUSCULAR; INTRAVENOUS; SOFT TISSUE at 12:33

## 2022-12-09 RX ADMIN — PROPOFOL 150 MG: 10 INJECTION, EMULSION INTRAVENOUS at 12:25

## 2022-12-09 ASSESSMENT — PAIN DESCRIPTION - PAIN TYPE
TYPE: SURGICAL PAIN
TYPE: SURGICAL PAIN

## 2022-12-09 NOTE — ANESTHESIA TIME REPORT
Anesthesia Start and Stop Event Times     Date Time Event    12/9/2022 1200 Ready for Procedure     1222 Anesthesia Start     1404 Anesthesia Stop        Responsible Staff  12/09/22    Name Role Begin End    Grant Casanova M.D. Anesth 1222 1404        Overtime Reason:  per edel, locums, etc.    Comments:

## 2022-12-09 NOTE — OR SURGEON
"Immediate Post OP Note    PreOp Diagnosis:     Chronic pelvic pain  Suspected endometriosis      PostOp Diagnosis:     Chronic pelvic pain  Severe pelvic adhesions  No evidence of endometriosis  No spill from right fallopian tube (obstruction vs spasm)      Procedure(s):  LAPAROSCOPIC ADHESIOLYSIS,  CHROMOPERTUBATION - Wound Class: Clean      Surgeon(s):  Joe Morley M.D.    Anesthesiologist/Type of Anesthesia:  Anesthesiologist: Grant Casanova M.D./General    Surgical Staff:  Circulator: Mariely Ro R.N.  Relief Circulator: Leighann Agrawal R.N.  Scrub Person: Nelly Albright; Divine Mcknight    Specimens removed if any:  * No specimens in log *    Estimated Blood Loss: 30 CC    Findings:     Free spill from left tube  No spill from right tube (obstruction vs. Spasm)  Adhesions: omentum-peritoneum, sigmoid-left pelvic peritoneum and adnexae, bilateral tubo-ovarian adhesions, anterior cul-de-sac  Fimbriae visible bilaterally but not \"wide-open\" (partially agglutinated)  No evidence of endometriosis    Complications: none        12/9/2022 2:08 PM Joe Morley M.D.  "

## 2022-12-09 NOTE — ANESTHESIA PREPROCEDURE EVALUATION
Case: [id number] Date/Time: 12/09/22 1215    Procedures:       LAPAROSCOPIC TREATMENT OF ENDOMETRIOSIS AND ADHESIONS, CHROMOPERTUBATION      CHROMOPERTUBATION,FALLOPIAN TUBE (Abdomen)    Pre-op diagnosis: CHRONIOC PELVIC PAIN ENDOMETRIOSIS    Location: [facility] ROOM 25 / SURGERY SAME DAY [facility]    Surgeons: Joe Morley M.D.          Relevant Problems   No relevant active problems       Physical Exam    Airway   Mallampati: II  TM distance: >3 FB  Neck ROM: full       Cardiovascular - normal exam  Rhythm: regular  Rate: normal  (-) murmur     Dental - normal exam           Pulmonary - normal exam  Breath sounds clear to auscultation     Abdominal    Neurological - normal exam                 Anesthesia Plan    ASA 2       Plan - general       Airway plan will be ETT          Induction: intravenous    Postoperative Plan: Postoperative administration of opioids is intended.    Pertinent diagnostic labs and testing reviewed    Informed Consent:    Anesthetic plan and risks discussed with patient.    Use of blood products discussed with: patient whom consented to blood products.

## 2022-12-09 NOTE — DISCHARGE INSTRUCTIONS
HOME CARE INSTRUCTIONS    ACTIVITY: Rest and take it easy for the first 24 hours.  A responsible adult is recommended to remain with you during that time.  It is normal to feel sleepy.  We encourage you to not do anything that requires balance, judgment or coordination.    FOR 24 HOURS DO NOT:  Drive, operate machinery or run household appliances.  Drink beer or alcoholic beverages.  Make important decisions or sign legal documents.    SPECIAL INSTRUCTIONS: See handout at front of packet.     DIET: To avoid nausea, slowly advance diet as tolerated, avoiding spicy or greasy foods for the first day.  Add more substantial food to your diet according to your physician's instructions.  Babies can be fed formula or breast milk as soon as they are hungry.  INCREASE FLUIDS AND FIBER TO AVOID CONSTIPATION.    SURGICAL DRESSING/BATHING: OK to shower tomorrow, do not submerge in water until OK with MD.      MEDICATIONS: Resume taking daily medication.  Take prescribed pain medication with food.  If no medication is prescribed, you may take non-aspirin pain medication if needed.  PAIN MEDICATION CAN BE VERY CONSTIPATING.  Take a stool softener or laxative such as senokot, pericolace, or milk of magnesia if needed.    Prescription given for Take tylenol 1000 mg and ibuprofen 600 mg every 6 hours around-the-clock for 4 days, then take them as needed for pain.     She already has 4 tramadol but I sent Rx for 8 more in case she needs them..  Last pain medication given: You had Tylenol at 11:00 and Toradol (an NSAID like motrin) at 12:30.    A follow-up appointment should be arranged with your doctor; call to schedule.    You should CALL YOUR PHYSICIAN if you develop:  Fever greater than 101 degrees F.  Pain not relieved by medication, or persistent nausea or vomiting.  Excessive bleeding (blood soaking through dressing) or unexpected drainage from the wound.  Extreme redness or swelling around the incision site, drainage of pus or  foul smelling drainage.  Inability to urinate or empty your bladder within 8 hours.  Problems with breathing or chest pain.    You should call 911 if you develop problems with breathing or chest pain.  If you are unable to contact your doctor or surgical center, you should go to the nearest emergency room or urgent care center.  Physician's telephone #: Dr. Morley [phone]    MILD FLU-LIKE SYMPTOMS ARE NORMAL.  YOU MAY EXPERIENCE GENERALIZED MUSCLE ACHES, THROAT IRRITATION, HEADACHE AND/OR SOME NAUSEA.    If any questions arise, call your doctor.  If your doctor is not available, please feel free to call the Surgical Center at [phone].  The Center is open Monday through Friday from 7AM to 7PM.      A registered nurse may call you a few days after your surgery to see how you are doing after your procedure.    You may also receive a survey in the mail within the next two weeks and we ask that you take a few moments to complete the survey and return it to us.  Our goal is to provide you with very good care and we value your comments.     Depression / Suicide Risk    As you are discharged from this [facility] facility, it is important to learn how to keep safe from harming yourself.    Recognize the warning signs:  Abrupt changes in personality, positive or negative- including increase in energy   Giving away possessions  Change in eating patterns- significant weight changes-  positive or negative  Change in sleeping patterns- unable to sleep or sleeping all the time   Unwillingness or inability to communicate  Depression  Unusual sadness, discouragement and loneliness  Talk of wanting to die  Neglect of personal appearance   Rebelliousness- reckless behavior  Withdrawal from people/activities they love  Confusion- inability to concentrate     If you or a loved one observes any of these behaviors or has concerns about self-harm, here's what you can do:  Talk about it- your feelings and reasons for  harming yourself  Remove any means that you might use to hurt yourself (examples: pills, rope, extension cords, firearm)  Get professional help from the community (Mental Health, Substance Abuse, psychological counseling)  Do not be alone:Call your Safe Contact- someone whom you trust who will be there for you.  Call your local CRISIS HOTLINE 655-7394 or 030-264-6132  Call your local Children's Mobile Crisis Response Team [place] (975) 087-5015 or www.[employer]  Call the toll free National Suicide Prevention Hotlines   National Suicide Prevention Lifeline 847-832-VTPZ (6881)  National [employer] 800-SUICIDE (774-5908)    I acknowledge receipt and understanding of these Home Care instructions.

## 2022-12-09 NOTE — OR NURSING
1403- Pt arrived to PACU, report received.  Pt on 10L mask.  3 lap sites to abd sealed with dermabond, all CDI.  Peripad in place without drainage.      1515- phase 2 met.     1609- Pt taken to restroom to void and change into own clothes.  Peripad with small amount of bloody drainage present.  Pt states she's currently on her period.     1614-Pt able to void.  Discharge instructions discussed with patient's . All questions answered at this time.      1635- Hnadoff report given to BRIAN Maciel.

## 2022-12-09 NOTE — ANESTHESIA PROCEDURE NOTES
Airway    Date/Time: 12/9/2022 12:26 PM  Performed by: Grant Casanova M.D.  Authorized by: Grant Casanova M.D.     Location:  OR  Urgency:  Elective  Indications for Airway Management:  Anesthesia      Spontaneous Ventilation: absent    Sedation Level:  Deep  Preoxygenated: Yes    Patient Position:  Sniffing  Final Airway Type:  Endotracheal airway  Final Endotracheal Airway:  ETT  Cuffed: Yes    Technique Used for Successful ETT Placement:  Direct laryngoscopy    Insertion Site:  Oral  Blade Type:  Khadar  Laryngoscope Blade/Videolaryngoscope Blade Size:  3  ETT Size (mm):  7.0  Measured from:  Teeth  ETT to Teeth (cm):  21  Placement Verified by: auscultation and capnometry    Cormack-Lehane Classification:  Grade I - full view of glottis  Number of Attempts at Approach:  1

## 2022-12-09 NOTE — OP REPORT
DATE OF SERVICE:  2022     OPERATIONS:  1.  Laparoscopic adhesiolysis.  2.  Chromopertubation.     SURGEON:  Joe Morley MD     ASSISTANT:  None.     ANESTHESIOLOGIST:  Grant Casanova MD     ANESTHESIA:  General.     PREOPERATIVE DIAGNOSES:  1.  Chronic pelvic pain.  2.  Suspected pelvic endometriosis.     POSTOPERATIVE DIAGNOSES:  1.  Chronic pelvic pain.  2.  Severe pelvic adhesions.  3.  No evidence of pelvic endometriosis.  4.  No spill from the right fallopian tube (tubal obstruction versus spasm).  5.  Partially-agglutinated fimbriae bilaterally     COMPLICATIONS:  None.     ESTIMATED BLOOD LOSS:  30 mL     SPECIMENS SENT TO PATHOLOGY:  None.     INDICATIONS:  This 36-year-old lady is  1, para 1.  She had a    in 2016.  She has been suffering from chronic pelvic pain.  We prescribed   Orilissa and it did improve her pain, but she had to discontinue it because of   severe psychologic side effects and hot flashes.  She comes for laparoscopic   evaluation , suspected endometriosis or adhesions.  She is interested in   future fertility, so chromopertubation was recommended and agreed upon.     DESCRIPTION OF PROCEDURE:  The patient went to the OR.  General anesthesia was   administered.  Her legs were placed in Luis Felipe stirrups with her thighs   slightly flexed.  Bimanual exam under anesthesia revealed no pelvic masses.    She was prepped and draped.  Timeout was done.  I placed a Iqbal catheter   using sterile technique.  I visualized the cervix.  I applied traction to the   cervix.  I injected 10 mL 0.25% Marcaine with epinephrine as a paracervical   block.  The uterus sounded to 6 cm.  I introduced a 6 cm long ASHLEY device into   the uterus, inflated the balloon, and removed the speculum and tenaculum.    Attention was turned to laparoscopy.     Marcaine 0.25% with epinephrine was instilled subcutaneously prior to each of   the 3 incisions.  I made an 11-mm vertical umbilical  "incision with a scalpel.    Veress needle was introduced intraperitoneally.  Carbon dioxide was   insufflated.  The Veress needle was withdrawn.  An 11-mm trocar and sheath   were introduced.  It was immediately evident that there was severe pelvic   adhesions, so I made two 7-mm incisions through the left and right lower   quadrants after transilluminating and finding avascular spaces.  7 mm trocars   and sheaths were introduced and anchored with intraperitoneal balloons.     The upper abdomen appeared normal. There were no adhesions between the liver and diaphragm.   The uterus was freely mobile, but there   were widespread pelvic adhesions  involving the greater omentum, the anterior   parietal peritoneum, the sigmoid colon, the left pelvic sidewall peritoneum, the left uterine cornua, both adnexae and ovarian fossae.    There were bilateral tubo-ovarian adhesions and there were anterior   cul-de-sac post- adhesions.  Both fallopian tubes were completely   visible.  We injected dilute methylene blue dye through the ASHLEY balloon.    There was free spill of blue water from the left fallopian tube, but I was not   able to document spill from the right fallopian tube.  I  temporarily obstructed the   left tube isthmus with an atraumatic grasping forceps while attempting   chromopertubation, and I was still unable to document spill from the right   tube, so I suspect right fallopian tube obstruction versus spasm.  The fimbria   were visible on both fallopian tubes, but they were not \"wide open.\"  The   fimbria appeared partially agglutinated on both sides.     I used a grasping forceps, scissors,  and small amount of unipolar cautery  to perform optimal   adhesiolysis.  While freeing up the sigmoid colon from the left pelvic   sidewall and the left adnexa and cornual areas, I kept the scissors well away   from the colon at all times.  Filmy and fibrous adhesions between the fallopian tubes and   ovaries on " "both sides were lysed with scissors.  A dense band of fibrovascular   adhesions between the left ovary and the left ovarian fossa was lysed with   unipolar cautery scissors, completely freeing up the left ovary.  Filmy   adhesions between the greater omentum and the anterior parietal peritoneum   just beneath the umbilical incisions were lysed with scissors (no electricity), completely   freeing them up. While applying firm upward pressure on the ASHLEY device, I   lysed several filmy anterior cul-de-sac adhesions until we got down to \"normal   post- scar tissue.\"  We filled the bladder to make sure there was no   evidence of bladder injury ;  the bladder was intact.    There was scant bleeding from the adhesiolysis sites, observed, with spontaneous hemostasis.     I saw no evidence of pelvic endometriosis anywhere.  There were no black   powder burn implants, there were no white stellate scars, there were no peritoneal  blebs, and there were no peritoneal windows.     After optimal adhesiolysis.  I irrigated the pelvis, found adequate hemostasis,  Then removed as much irrigant as possible.  The abdomen was desufflated.  All   three sheaths were removed.  I closed the fascia beneath the umbilicus with a   single suture of 0 Vicryl.  I closed all three skin incisions with   subcuticular 4-0 Vicryl.  Dermabond wound adhesive was applied to each   incision.  The Iqbal catheter was removed.  The ASHLEY device was deflated and   removed intact.  The patient is in the recovery room in satisfactory   condition.    DISCUSSION:  These adhesions were more extensive than  \"routine post- adhesions.\"    I suspect she's had pelvic infection sometime in the past.   I'm worried about future fertility in view of the severe adhesions, partially-agglutinated fimbriae, and absence of right fallopian tube spill.          ______________________________  MD OLIVIA Stephen/FAMILIA    DD:  2022 14:30  DT:  " 12/09/2022 15:21    Job#:  [id number]

## 2022-12-10 NOTE — OR NURSING
1635- assumed care of pt,  PIV d/c'd intact. Awaiting for ride home.    Denies needs at this time.     1700- pt escorted out, ambulates to awaiting ride, steady gait.  All belongings accounted for.

## 2023-08-07 ENCOUNTER — HOSPITAL ENCOUNTER (OUTPATIENT)
Dept: LAB | Facility: MEDICAL CENTER | Age: 38
End: 2023-08-07
Attending: PHYSICIAN ASSISTANT
Payer: COMMERCIAL

## 2023-08-07 PROCEDURE — 88175 CYTOPATH C/V AUTO FLUID REDO: CPT

## 2023-08-09 LAB
COMMENT NL11729A: NORMAL
CYTOLOGIST CVX/VAG CYTO: NORMAL
CYTOLOGY CVX/VAG DOC CYTO: NORMAL
CYTOLOGY CVX/VAG DOC THIN PREP: NORMAL
NOTE NL11727A: NORMAL
OTHER STN SPEC: NORMAL
QC REVIEWED BY NL11722A: NORMAL
STAT OF ADQ CVX/VAG CYTO-IMP: NORMAL

## 2023-12-22 ENCOUNTER — HOSPITAL ENCOUNTER (OUTPATIENT)
Dept: LAB | Facility: MEDICAL CENTER | Age: 38
End: 2023-12-22
Attending: OBSTETRICS & GYNECOLOGY
Payer: COMMERCIAL

## 2023-12-22 LAB
ABO GROUP BLD: NORMAL
B-HCG SERPL-ACNC: 297 MIU/ML (ref 0–5)
BLD GP AB SCN SERPL QL: NORMAL
RH BLD: NORMAL

## 2023-12-22 PROCEDURE — 36415 COLL VENOUS BLD VENIPUNCTURE: CPT

## 2023-12-22 PROCEDURE — 86900 BLOOD TYPING SEROLOGIC ABO: CPT

## 2023-12-22 PROCEDURE — 84702 CHORIONIC GONADOTROPIN TEST: CPT

## 2023-12-22 PROCEDURE — 86850 RBC ANTIBODY SCREEN: CPT

## 2023-12-22 PROCEDURE — 86901 BLOOD TYPING SEROLOGIC RH(D): CPT

## 2023-12-26 ENCOUNTER — HOSPITAL ENCOUNTER (OUTPATIENT)
Dept: LAB | Facility: MEDICAL CENTER | Age: 38
End: 2023-12-26
Attending: OBSTETRICS & GYNECOLOGY
Payer: COMMERCIAL

## 2023-12-26 LAB — B-HCG SERPL-ACNC: 141 MIU/ML (ref 0–5)

## 2023-12-26 PROCEDURE — 84702 CHORIONIC GONADOTROPIN TEST: CPT

## 2023-12-26 PROCEDURE — 36415 COLL VENOUS BLD VENIPUNCTURE: CPT

## 2024-06-10 ENCOUNTER — HOSPITAL ENCOUNTER (OUTPATIENT)
Facility: MEDICAL CENTER | Age: 39
End: 2024-06-10
Attending: OBSTETRICS & GYNECOLOGY
Payer: COMMERCIAL

## 2024-06-10 LAB
ABO GROUP BLD: NORMAL
BASOPHILS # BLD AUTO: 0.5 % (ref 0–1.8)
BASOPHILS # BLD: 0.04 K/UL (ref 0–0.12)
BLD GP AB SCN SERPL QL: NORMAL
EOSINOPHIL # BLD AUTO: 0.06 K/UL (ref 0–0.51)
EOSINOPHIL NFR BLD: 0.7 % (ref 0–6.9)
ERYTHROCYTE [DISTWIDTH] IN BLOOD BY AUTOMATED COUNT: 43.1 FL (ref 35.9–50)
HBV SURFACE AG SER QL: ABNORMAL
HCT VFR BLD AUTO: 40.4 % (ref 37–47)
HCV AB SER QL: ABNORMAL
HGB BLD-MCNC: 13.1 G/DL (ref 12–16)
HIV 1+2 AB+HIV1 P24 AG SERPL QL IA: NORMAL
IMM GRANULOCYTES # BLD AUTO: 0.03 K/UL (ref 0–0.11)
IMM GRANULOCYTES NFR BLD AUTO: 0.4 % (ref 0–0.9)
LYMPHOCYTES # BLD AUTO: 1.86 K/UL (ref 1–4.8)
LYMPHOCYTES NFR BLD: 21.8 % (ref 22–41)
MCH RBC QN AUTO: 29.2 PG (ref 27–33)
MCHC RBC AUTO-ENTMCNC: 32.4 G/DL (ref 32.2–35.5)
MCV RBC AUTO: 90.2 FL (ref 81.4–97.8)
MONOCYTES # BLD AUTO: 0.42 K/UL (ref 0–0.85)
MONOCYTES NFR BLD AUTO: 4.9 % (ref 0–13.4)
NEUTROPHILS # BLD AUTO: 6.11 K/UL (ref 1.82–7.42)
NEUTROPHILS NFR BLD: 71.7 % (ref 44–72)
NRBC # BLD AUTO: 0 K/UL
NRBC BLD-RTO: 0 /100 WBC (ref 0–0.2)
PLATELET # BLD AUTO: 235 K/UL (ref 164–446)
PMV BLD AUTO: 12.7 FL (ref 9–12.9)
RBC # BLD AUTO: 4.48 M/UL (ref 4.2–5.4)
RH BLD: NORMAL
RUBV AB SER QL: 1.68 IU/ML
T PALLIDUM AB SER QL IA: ABNORMAL
WBC # BLD AUTO: 8.5 K/UL (ref 4.8–10.8)

## 2024-06-10 PROCEDURE — 86803 HEPATITIS C AB TEST: CPT

## 2024-06-10 PROCEDURE — 87340 HEPATITIS B SURFACE AG IA: CPT

## 2024-06-10 PROCEDURE — 86900 BLOOD TYPING SEROLOGIC ABO: CPT

## 2024-06-10 PROCEDURE — 86901 BLOOD TYPING SEROLOGIC RH(D): CPT

## 2024-06-10 PROCEDURE — 87389 HIV-1 AG W/HIV-1&-2 AB AG IA: CPT

## 2024-06-10 PROCEDURE — 36415 COLL VENOUS BLD VENIPUNCTURE: CPT

## 2024-06-10 PROCEDURE — 85025 COMPLETE CBC W/AUTO DIFF WBC: CPT

## 2024-06-10 PROCEDURE — 86762 RUBELLA ANTIBODY: CPT

## 2024-06-10 PROCEDURE — 86850 RBC ANTIBODY SCREEN: CPT

## 2024-06-10 PROCEDURE — 87086 URINE CULTURE/COLONY COUNT: CPT

## 2024-06-10 PROCEDURE — 86592 SYPHILIS TEST NON-TREP QUAL: CPT

## 2024-06-10 PROCEDURE — 86780 TREPONEMA PALLIDUM: CPT

## 2024-06-12 LAB
BACTERIA UR CULT: NORMAL
SIGNIFICANT IND 70042: NORMAL
SITE SITE: NORMAL
SOURCE SOURCE: NORMAL

## 2024-09-18 ENCOUNTER — HOSPITAL ENCOUNTER (EMERGENCY)
Facility: MEDICAL CENTER | Age: 39
End: 2024-09-18
Attending: EMERGENCY MEDICINE
Payer: COMMERCIAL

## 2024-09-18 ENCOUNTER — OFFICE VISIT (OUTPATIENT)
Dept: URGENT CARE | Facility: PHYSICIAN GROUP | Age: 39
End: 2024-09-18
Payer: COMMERCIAL

## 2024-09-18 VITALS
HEART RATE: 74 BPM | RESPIRATION RATE: 16 BRPM | WEIGHT: 184.53 LBS | TEMPERATURE: 97.6 F | BODY MASS INDEX: 32.7 KG/M2 | HEIGHT: 63 IN | OXYGEN SATURATION: 97 % | SYSTOLIC BLOOD PRESSURE: 124 MMHG | DIASTOLIC BLOOD PRESSURE: 76 MMHG

## 2024-09-18 VITALS
HEIGHT: 62 IN | RESPIRATION RATE: 16 BRPM | SYSTOLIC BLOOD PRESSURE: 120 MMHG | HEART RATE: 79 BPM | TEMPERATURE: 97.9 F | BODY MASS INDEX: 33.86 KG/M2 | DIASTOLIC BLOOD PRESSURE: 80 MMHG | WEIGHT: 183.97 LBS | OXYGEN SATURATION: 98 %

## 2024-09-18 DIAGNOSIS — J36 TONSILLAR ABSCESS: ICD-10-CM

## 2024-09-18 DIAGNOSIS — J02.0 STREP PHARYNGITIS: ICD-10-CM

## 2024-09-18 DIAGNOSIS — J02.9 SORE THROAT: ICD-10-CM

## 2024-09-18 DIAGNOSIS — J35.1 ENLARGED TONSILS: ICD-10-CM

## 2024-09-18 LAB — S PYO DNA SPEC NAA+PROBE: DETECTED

## 2024-09-18 PROCEDURE — 700102 HCHG RX REV CODE 250 W/ 637 OVERRIDE(OP): Performed by: EMERGENCY MEDICINE

## 2024-09-18 PROCEDURE — 3079F DIAST BP 80-89 MM HG: CPT

## 2024-09-18 PROCEDURE — A9270 NON-COVERED ITEM OR SERVICE: HCPCS | Performed by: EMERGENCY MEDICINE

## 2024-09-18 PROCEDURE — 3074F SYST BP LT 130 MM HG: CPT

## 2024-09-18 PROCEDURE — 99205 OFFICE O/P NEW HI 60 MIN: CPT

## 2024-09-18 PROCEDURE — 99283 EMERGENCY DEPT VISIT LOW MDM: CPT

## 2024-09-18 PROCEDURE — 87651 STREP A DNA AMP PROBE: CPT

## 2024-09-18 RX ORDER — AMOXICILLIN 500 MG/1
500 CAPSULE ORAL ONCE
Status: COMPLETED | OUTPATIENT
Start: 2024-09-18 | End: 2024-09-18

## 2024-09-18 RX ORDER — ASPIRIN 81 MG/1
81 TABLET, CHEWABLE ORAL DAILY
COMMUNITY

## 2024-09-18 RX ORDER — AMOXICILLIN 500 MG/1
500 CAPSULE ORAL 2 TIMES DAILY
Qty: 20 CAPSULE | Refills: 0 | Status: ACTIVE | OUTPATIENT
Start: 2024-09-18 | End: 2024-09-28

## 2024-09-18 RX ADMIN — AMOXICILLIN 500 MG: 500 CAPSULE ORAL at 21:47

## 2024-09-18 ASSESSMENT — ENCOUNTER SYMPTOMS
FEVER: 0
CHILLS: 0
VOMITING: 0
SHORTNESS OF BREATH: 1
NAUSEA: 0
ABDOMINAL PAIN: 0
DIARRHEA: 0
SORE THROAT: 1
MYALGIAS: 0
SWOLLEN GLANDS: 1
HEADACHES: 0
TROUBLE SWALLOWING: 0
HOARSE VOICE: 1
COUGH: 0

## 2024-09-19 NOTE — PROGRESS NOTES
Subjective:   Tina Crocker is a 38 y.o. female who presents for Pharyngitis (Sore throat, tonsil on the (R) side is swollen noticed Monday  X 1 week )      Pharyngitis   This is a new problem. Episode onset: x1 week. The problem has been gradually worsening. The pain is worse on the right side. There has been no fever. Associated symptoms include a hoarse voice, shortness of breath and swollen glands. Pertinent negatives include no abdominal pain, congestion, coughing, diarrhea, ear pain, headaches, trouble swallowing or vomiting. She has had no exposure to strep or mono.       Review of Systems   Constitutional:  Negative for chills, fever and malaise/fatigue.   HENT:  Positive for hoarse voice and sore throat. Negative for congestion, ear pain, hearing loss and trouble swallowing.    Respiratory:  Positive for shortness of breath. Negative for cough.    Cardiovascular:  Negative for chest pain.   Gastrointestinal:  Negative for abdominal pain, diarrhea, nausea and vomiting.   Genitourinary:  Negative for dysuria.   Musculoskeletal:  Negative for myalgias.   Skin:  Negative for rash.   Neurological:  Negative for headaches.       Past Medical History:   Diagnosis Date    Psychiatric problem 11/30/2022    depresson anxiety       Current Outpatient Medications Ordered in Epic   Medication Sig Dispense Refill    aspirin (ASA) 81 MG Chew Tab chewable tablet Chew 81 mg every day.       No current [facility]-ordered facility-administered medications on file.       Past Surgical History:   Procedure Laterality Date    GA REOPEN FALLOPIAN TUBE,CHROMOTUBATION Bilateral 12/9/2022    Procedure: CHROMOPERTUBATION,FALLOPIAN TUBE;  Surgeon: Joe Morley M.D.;  Location: SURGERY SAME DAY [facility];  Service: Gynecology    EXCISION OR FULGURATION, ENDOMETRIOSIS, LAPAROSCOPIC N/A 12/9/2022    Procedure: LAPAROSCOPIC TREATMENT OF ENDOMETRIOSIS AND LYSIS OF ADHESIONS, CHROMOPERTUBATION;  Surgeon: Joe Morley M.D.;   "Location: SURGERY SAME DAY [facility];  Service: Gynecology    OTHER ORTHOPEDIC SURGERY      cyst on left knee as a [age] year old       Social History     Tobacco Use    Smoking status: Former     Types: Cigarettes    Smokeless tobacco: Never   Vaping Use    Vaping status: Never Used   Substance Use Topics    Alcohol use: Not Currently     Comment: occ    Drug use: No       family history is not on file.        Objective:     /80 (BP Location: Left arm, Patient Position: Sitting, BP Cuff Size: Adult)   Pulse 79   Temp 36.6 °C (97.9 °F) (Temporal)   Resp 16   Ht 1.575 m (5' 2\")   Wt 83.4 kg (183 lb 15.6 oz)   SpO2 98%     Physical Exam  Vitals and nursing note reviewed.   Constitutional:       General: She is not in acute distress.     Appearance: Normal appearance. She is not ill-appearing.   HENT:      Head: Normocephalic and atraumatic.      Right Ear: Tympanic membrane normal.      Left Ear: Tympanic membrane normal.      Nose: Nose normal.      Mouth/Throat:      Mouth: Mucous membranes are moist.      Pharynx: Posterior oropharyngeal erythema present. No pharyngeal swelling, oropharyngeal exudate, uvula swelling or postnasal drip.      Tonsils: Tonsillar abscess present. No tonsillar exudate. 3+ on the right.     Eyes:      Conjunctiva/sclera: Conjunctivae normal.   Cardiovascular:      Rate and Rhythm: Normal rate.      Heart sounds: Normal heart sounds.   Pulmonary:      Effort: Pulmonary effort is normal.   Abdominal:      General: Abdomen is flat.      Palpations: Abdomen is soft.   Musculoskeletal:         General: Normal range of motion.      Cervical back: Normal range of motion.   Skin:     General: Skin is warm and dry.      Capillary Refill: Capillary refill takes less than 2 seconds.   Neurological:      Mental Status: She is alert and oriented to person, place, and time.   Psychiatric:         Mood and Affect: Mood normal.         Behavior: Behavior normal.         Assessment/Plan:       1. " Tonsillar abscess          After assessment it does appear the patient has large right tonsillar abscess.  Patient doesn' reports significant paint but does report that she has noticed a foul taste in her mouth that she felt like may have been drainage from tonsil.  Patient does report mild difficulty swallowing due to the size of her right tonsil but denies any shortness of breath.  Patient had no significant swelling of throat uvula was slightly shifted to the left.  Patient is also 23 weeks pregnant.  At this time patient was instructed that she would need to seek further care in the emergency department.  Patient reported that she will drive herself via POV over to [facility] for further management.  Transfer center was contacted about patient arrival.    Differential diagnosis, natural history, and supportive care discussed. We also reviewed side effects of medication including allergic response, GI upset, tendon injury, rash, sedation etc. Patient and/or guardian voices understanding.      Advised the patient to follow-up with the primary care physician for recheck, reevaluation, and consideration of further management.    I personally reviewed prior external notes and test results pertinent to today's visit as well as additional imaging and testing completed in clinic today.     Please note that this dictation was created using voice recognition software. I have made every reasonable attempt to correct obvious errors, but I expect that there are errors of grammar and possibly content that I did not discover before finalizing the note.    This note was electronically signed by VINOD Zamora

## 2024-09-19 NOTE — PROGRESS NOTES
2035 - This RN to bedside to doppler heart tones. . Pt denies contractions, LOF, bleeding, endorses positive fetal movement. Denies any obstetrical complaints at this time.

## 2024-09-19 NOTE — DISCHARGE INSTRUCTIONS
Today your evaluated the emergency department with a throat infection.  You tested positive for strep throat.  Based on my exam, I am not concerned at this time that you have a complication from a bacterial pharyngitis.  I have low suspicion that you have a peritonsillar abscess or deep neck space infection.    You will need to take a antibiotic called amoxicillin.  You will take 1 tablet in the morning 1 tablet needing for next 10 days.  You can take Tylenol as needed for pain.    If you develop any worsening symptoms including fever, sore throat, worsening swelling in your throat, difficulty swallowing or managing your secretions, or neck swelling, please return to the emergency department to be reevaluated.

## 2024-09-19 NOTE — ED TRIAGE NOTES
Chief Complaint   Patient presents with    Sore Throat     Pt to triage c/o sore throat x 1 week, also noticed right tonsil is swollen. Seen over at urgent care, sent here to r/o tonsillar abscess. Able to tolerate po. Pt is 23 weeks pregnant. . Denies any abdominal pain/vaginal bleeding.      Able to manage secretion.  Charge Nurse Aliyah from L&D notified. States to call them back at [phone] once the pt has a room assigned and will let us know if they need to monitor pt in ED.  Educated on triage process. Instructed to notify staff for any worsening symptoms. Denies any recent travel. Denies exposure to known covid positive patients. Denies any respiratory symptoms.

## 2024-09-19 NOTE — ED PROVIDER NOTES
ED Provider Note    CHIEF COMPLAINT  Chief Complaint   Patient presents with    Sore Throat     Pt to triage c/o sore throat x 1 week, also noticed right tonsil is swollen. Seen over at urgent care, sent here to r/o tonsillar abscess. Able to tolerate po. Pt is 23 weeks pregnant. . Denies any abdominal pain/vaginal bleeding.      EXTERNAL RECORDS REVIEWED  Other Urgent care records reviewed: Patient seen for tonsillar swelling this afternoon, advised to come to the emergency department for evaluation of a peritonsillar abscess.    HPI/ROS  LIMITATION TO HISTORY   Select: : None  OUTSIDE HISTORIAN(S):  None    Tina Crocker is a 38 y.o. female, currently 23 weeks pregnant, who presents who presents to the emergency department for evaluation of a peritonsillar abscess.  She was seen at urgent care earlier this afternoon and advised to come to the emergency department for evaluation.  Patient reports hoarse voice and mild sore throat over the past week.  Her son has been sick with similar symptoms.  Denies fever, chills, neck swelling, decreased range of motion of her neck, difficulty swallowing, drooling.    PAST MEDICAL HISTORY   has a past medical history of Psychiatric problem (2022).    SURGICAL HISTORY   has a past surgical history that includes other orthopedic surgery; reopen fallopian tube,chromotubation (Bilateral, 2022); and excision or fulguration, endometriosis, laparoscopic (N/A, 2022).    FAMILY HISTORY  History reviewed. No pertinent family history.    SOCIAL HISTORY  Social History     Tobacco Use    Smoking status: Former     Types: Cigarettes    Smokeless tobacco: Never   Vaping Use    Vaping status: Never Used   Substance and Sexual Activity    Alcohol use: Not Currently     Comment: occ    Drug use: No    Sexual activity: Yes     Partners: Male       CURRENT MEDICATIONS  Home Medications       Reviewed by Isabelle Quintana R.N. (Registered Nurse) on 24 at 1832  Med  "List Status: Partial     Medication Last Dose Status   aspirin (ASA) 81 MG Chew Tab chewable tablet  Active                    ALLERGIES  No Known Allergies    PHYSICAL EXAM  VITAL SIGNS: /76   Pulse 74   Temp 36.4 °C (97.6 °F) (Temporal)   Resp 16   Ht 1.6 m (5' 3\")   Wt 83.7 kg (184 lb 8.4 oz)   SpO2 97%   BMI 32.69 kg/m²    General: Well-appearing, no acute distress.   Eyes: EOM grossly intact BL.  HENT: Right tonsil is enlarged, no exudates, uvula midline. Left tonsil appears normal in size. No peritonsillar swelling.   Neck: Full range of motion. No cervical lymphadenopathy or swelling.   Lungs: Non-labored breathing. Clear to auscultation bilaterally. No wheezing or crackles.  Cardiac: Regular rate and rhythm. No murmurs. No lower extremity swelling. Equal and symmetric distal pulses. Well-perfused.  MSK: Symmetric movement of all extremities.  Skin: No rashes, lesions, bruising, or petechiae. Well-perfused.   Neuro: Grossly nonfocal neurologic exam. Face symmetric. Normal mentation.     EKG/LABS  None    RADIOLOGY/PROCEDURES   I have independently interpreted the diagnostic imaging associated with this visit and am waiting the final reading from the radiologist.   My preliminary interpretation is as follows: None    Radiologist interpretation:  No orders to display       COURSE & MEDICAL DECISION MAKING    ASSESSMENT, COURSE AND PLAN  Care Narrative:   Tina Crocker is a 38 y.o. female, currently 23 weeks pregnant, who presents who presents to the emergency department for evaluation of a peritonsillar abscess.  She was sent from urgent care for evaluation of a PTA versus deep neck space infection.  On my exam, she is well-appearing and nontoxic.Vital signs are within normal limits and she is afebrile.  She has a hoarse voice, but she is breathing comfortably with clear lung sounds, no stridor, no muffled voice.  She has no submandibular swelling.  No cervical lymphadenopathy.  Her right " tonsil is enlarged, but no exudates.  Uvula is midline.  No uvular swelling.  The left tonsil appears normal in size.  I do not appreciate significant right-sided peritonsillar swelling.  She has full range of motion of her neck.  No trismus.  Differential includes bacterial pharyngitis, tonsillitis, deep neck space infection, peritonsillar abscess.    Based on her exam, I am not convinced she has a peritonsillar abscess.  I think this is likely a pharyngitis/tonsillitis.  I explained to her that usually in this situation, I would obtain CT imaging of the neck to evaluate for a tonsillar abscess versus peritonsillar abscess versus deep neck space infection, however her situation is more complicated since she is currently 23 weeks pregnant.  We discussed obtaining CT imaging versus discharge with antibiotics and close follow-up within the next 24-48 hours if her symptoms worsen.  Patient opted for a trial of outpatient antibiotics.  I feel this is reasonable since she is very well-appearing without any signs of systemic infection.  Rapid strep testing was positive.  She received a dose of amoxicillin while in the emergency department.  I recommended Tylenol as needed for pain control, however she is having minimal pain at this time.  I prescribed a 10-day course of amoxicillin.  I reviewed very strict return precautions including fever, change in phonation, drooling, worsening sore throat, difficulty breathing, neck swelling or decreased range of motion of the neck, or enlargement of the tonsil.  I explained that if any of her symptoms worsen, she needs to return to the emergency department for reevaluation and possible CT imaging at that time.  Patient is in agreement with this plan, all of her questions were answered, she was discharged in stable condition.    ADDITIONAL PROBLEMS MANAGED  N/A    DISPOSITION AND DISCUSSIONS  I have discussed management of the patient with the following physicians and KAILEE's:   None    Discussion of management with other [facility] or appropriate source(s): None     Escalation of care considered, and ultimately not performed:acute inpatient care management, however at this time, the patient is most appropriate for outpatient management    Barriers to care at this time, including but not limited to: Patient does not have established PCP.     Decision tools and prescription drugs considered including, but not limited to: Antibiotics Amoxicillin .    FINAL DIAGNOSIS  1. Strep pharyngitis Acute   2. Enlarged tonsils Acute   3. Sore throat Acute        Electronically signed by: Yaima Frankel D.O., 9/18/2024 8:49 PM

## 2024-10-02 ENCOUNTER — HOSPITAL ENCOUNTER (OUTPATIENT)
Facility: MEDICAL CENTER | Age: 39
End: 2024-10-02
Attending: OBSTETRICS & GYNECOLOGY
Payer: COMMERCIAL

## 2024-10-02 LAB
GLUCOSE 1H P 50 G GLC PO SERPL-MCNC: 135 MG/DL (ref 70–139)
HCT VFR BLD AUTO: 35 % (ref 37–47)
HGB BLD-MCNC: 11.3 G/DL (ref 12–16)
PLATELET # BLD AUTO: 200 K/UL (ref 164–446)
T PALLIDUM AB SER QL IA: NORMAL

## 2024-10-02 PROCEDURE — 85018 HEMOGLOBIN: CPT

## 2024-10-02 PROCEDURE — 36415 COLL VENOUS BLD VENIPUNCTURE: CPT

## 2024-10-02 PROCEDURE — 85014 HEMATOCRIT: CPT

## 2024-10-02 PROCEDURE — 86780 TREPONEMA PALLIDUM: CPT

## 2024-10-02 PROCEDURE — 82950 GLUCOSE TEST: CPT

## 2024-10-02 PROCEDURE — 85049 AUTOMATED PLATELET COUNT: CPT

## 2024-12-02 ENCOUNTER — HOSPITAL ENCOUNTER (OUTPATIENT)
Facility: MEDICAL CENTER | Age: 39
End: 2024-12-02
Attending: OBSTETRICS & GYNECOLOGY
Payer: COMMERCIAL

## 2024-12-02 PROCEDURE — 87081 CULTURE SCREEN ONLY: CPT

## 2024-12-02 PROCEDURE — 87150 DNA/RNA AMPLIFIED PROBE: CPT

## 2024-12-04 LAB — GP B STREP DNA SPEC QL NAA+PROBE: NEGATIVE

## 2024-12-16 ENCOUNTER — HOSPITAL ENCOUNTER (INPATIENT)
Facility: MEDICAL CENTER | Age: 39
LOS: 4 days | End: 2024-12-20
Attending: OBSTETRICS & GYNECOLOGY | Admitting: OBSTETRICS & GYNECOLOGY
Payer: COMMERCIAL

## 2024-12-16 DIAGNOSIS — D62 ANEMIA ASSOCIATED WITH ACUTE BLOOD LOSS: ICD-10-CM

## 2024-12-16 DIAGNOSIS — O13.9 GESTATIONAL HYPERTENSION, ANTEPARTUM: ICD-10-CM

## 2024-12-16 DIAGNOSIS — G89.18 POSTOPERATIVE PAIN: ICD-10-CM

## 2024-12-16 DIAGNOSIS — E87.6 HYPOKALEMIA DUE TO INADEQUATE POTASSIUM INTAKE: ICD-10-CM

## 2024-12-16 LAB
ALBUMIN SERPL BCP-MCNC: 3.3 G/DL (ref 3.2–4.9)
ALBUMIN/GLOB SERPL: 1.1 G/DL
ALP SERPL-CCNC: 132 U/L (ref 30–99)
ALT SERPL-CCNC: 18 U/L (ref 2–50)
ANION GAP SERPL CALC-SCNC: 12 MMOL/L (ref 7–16)
AST SERPL-CCNC: 18 U/L (ref 12–45)
BASOPHILS # BLD AUTO: 0.4 % (ref 0–1.8)
BASOPHILS # BLD: 0.03 K/UL (ref 0–0.12)
BILIRUB SERPL-MCNC: 0.3 MG/DL (ref 0.1–1.5)
BUN SERPL-MCNC: 6 MG/DL (ref 8–22)
CALCIUM ALBUM COR SERPL-MCNC: 9.9 MG/DL (ref 8.5–10.5)
CALCIUM SERPL-MCNC: 9.3 MG/DL (ref 8.5–10.5)
CHLORIDE SERPL-SCNC: 103 MMOL/L (ref 96–112)
CO2 SERPL-SCNC: 24 MMOL/L (ref 20–33)
CREAT SERPL-MCNC: 0.48 MG/DL (ref 0.5–1.4)
CREAT UR-MCNC: 88.44 MG/DL
EOSINOPHIL # BLD AUTO: 0.04 K/UL (ref 0–0.51)
EOSINOPHIL NFR BLD: 0.6 % (ref 0–6.9)
ERYTHROCYTE [DISTWIDTH] IN BLOOD BY AUTOMATED COUNT: 43.8 FL (ref 35.9–50)
GFR SERPLBLD CREATININE-BSD FMLA CKD-EPI: 123 ML/MIN/1.73 M 2
GLOBULIN SER CALC-MCNC: 3.1 G/DL (ref 1.9–3.5)
GLUCOSE SERPL-MCNC: 113 MG/DL (ref 65–99)
HCT VFR BLD AUTO: 31.6 % (ref 37–47)
HGB BLD-MCNC: 10.3 G/DL (ref 12–16)
IMM GRANULOCYTES # BLD AUTO: 0.03 K/UL (ref 0–0.11)
IMM GRANULOCYTES NFR BLD AUTO: 0.4 % (ref 0–0.9)
LYMPHOCYTES # BLD AUTO: 1.83 K/UL (ref 1–4.8)
LYMPHOCYTES NFR BLD: 26 % (ref 22–41)
MCH RBC QN AUTO: 28.3 PG (ref 27–33)
MCHC RBC AUTO-ENTMCNC: 32.6 G/DL (ref 32.2–35.5)
MCV RBC AUTO: 86.8 FL (ref 81.4–97.8)
MONOCYTES # BLD AUTO: 0.47 K/UL (ref 0–0.85)
MONOCYTES NFR BLD AUTO: 6.7 % (ref 0–13.4)
NEUTROPHILS # BLD AUTO: 4.63 K/UL (ref 1.82–7.42)
NEUTROPHILS NFR BLD: 65.9 % (ref 44–72)
NRBC # BLD AUTO: 0 K/UL
NRBC BLD-RTO: 0 /100 WBC (ref 0–0.2)
PLATELET # BLD AUTO: 165 K/UL (ref 164–446)
PMV BLD AUTO: 12.5 FL (ref 9–12.9)
POTASSIUM SERPL-SCNC: 3.2 MMOL/L (ref 3.6–5.5)
PROT SERPL-MCNC: 6.4 G/DL (ref 6–8.2)
PROT UR-MCNC: 16 MG/DL (ref 0–15)
PROT/CREAT UR: 181 MG/G (ref 10–107)
RBC # BLD AUTO: 3.64 M/UL (ref 4.2–5.4)
SODIUM SERPL-SCNC: 139 MMOL/L (ref 135–145)
URATE SERPL-MCNC: 5.6 MG/DL (ref 1.9–8.2)
WBC # BLD AUTO: 7 K/UL (ref 4.8–10.8)

## 2024-12-16 PROCEDURE — 82570 ASSAY OF URINE CREATININE: CPT

## 2024-12-16 PROCEDURE — 36415 COLL VENOUS BLD VENIPUNCTURE: CPT

## 2024-12-16 PROCEDURE — 302449 STATCHG TRIAGE ONLY (STATISTIC)

## 2024-12-16 PROCEDURE — 84156 ASSAY OF PROTEIN URINE: CPT

## 2024-12-16 PROCEDURE — 84550 ASSAY OF BLOOD/URIC ACID: CPT

## 2024-12-16 PROCEDURE — A9270 NON-COVERED ITEM OR SERVICE: HCPCS | Performed by: OBSTETRICS & GYNECOLOGY

## 2024-12-16 PROCEDURE — 85025 COMPLETE CBC W/AUTO DIFF WBC: CPT

## 2024-12-16 PROCEDURE — 700102 HCHG RX REV CODE 250 W/ 637 OVERRIDE(OP): Performed by: OBSTETRICS & GYNECOLOGY

## 2024-12-16 PROCEDURE — 80053 COMPREHEN METABOLIC PANEL: CPT

## 2024-12-16 PROCEDURE — 770002 HCHG ROOM/CARE - OB PRIVATE (112)

## 2024-12-16 RX ORDER — NIFEDIPINE 10 MG/1
10 CAPSULE ORAL ONCE
Status: COMPLETED | OUTPATIENT
Start: 2024-12-16 | End: 2024-12-16

## 2024-12-16 RX ORDER — NIFEDIPINE 10 MG/1
CAPSULE ORAL
Status: ACTIVE
Start: 2024-12-16 | End: 2024-12-17

## 2024-12-16 RX ORDER — LABETALOL 100 MG/1
200 TABLET, FILM COATED ORAL ONCE
Status: COMPLETED | OUTPATIENT
Start: 2024-12-16 | End: 2024-12-16

## 2024-12-16 RX ADMIN — NIFEDIPINE 10 MG: 10 CAPSULE ORAL at 18:54

## 2024-12-16 RX ADMIN — LABETALOL HYDROCHLORIDE 200 MG: 100 TABLET, FILM COATED ORAL at 19:21

## 2024-12-16 RX ADMIN — NIFEDIPINE 10 MG: 10 CAPSULE ORAL at 17:55

## 2024-12-16 SDOH — ECONOMIC STABILITY: TRANSPORTATION INSECURITY
IN THE PAST 12 MONTHS, HAS LACK OF RELIABLE TRANSPORTATION KEPT YOU FROM MEDICAL APPOINTMENTS, MEETINGS, WORK OR FROM GETTING THINGS NEEDED FOR DAILY LIVING?: NO

## 2024-12-16 SDOH — ECONOMIC STABILITY: TRANSPORTATION INSECURITY
IN THE PAST 12 MONTHS, HAS THE LACK OF TRANSPORTATION KEPT YOU FROM MEDICAL APPOINTMENTS OR FROM GETTING MEDICATIONS?: NO

## 2024-12-16 ASSESSMENT — LIFESTYLE VARIABLES
TOTAL SCORE: 0
HOW MANY TIMES IN THE PAST YEAR HAVE YOU HAD 5 OR MORE DRINKS IN A DAY: 0
EVER HAD A DRINK FIRST THING IN THE MORNING TO STEADY YOUR NERVES TO GET RID OF A HANGOVER: NO
EVER FELT BAD OR GUILTY ABOUT YOUR DRINKING: NO
HAVE YOU EVER FELT YOU SHOULD CUT DOWN ON YOUR DRINKING: NO
TOTAL SCORE: 0
CONSUMPTION TOTAL: NEGATIVE
ALCOHOL_USE: NO
ON A TYPICAL DAY WHEN YOU DRINK ALCOHOL HOW MANY DRINKS DO YOU HAVE: 0
AVERAGE NUMBER OF DAYS PER WEEK YOU HAVE A DRINK CONTAINING ALCOHOL: 0
TOTAL SCORE: 0
HAVE PEOPLE ANNOYED YOU BY CRITICIZING YOUR DRINKING: NO

## 2024-12-16 ASSESSMENT — PATIENT HEALTH QUESTIONNAIRE - PHQ9
2. FEELING DOWN, DEPRESSED, IRRITABLE, OR HOPELESS: NOT AT ALL
1. LITTLE INTEREST OR PLEASURE IN DOING THINGS: NOT AT ALL
SUM OF ALL RESPONSES TO PHQ9 QUESTIONS 1 AND 2: 0

## 2024-12-16 ASSESSMENT — SOCIAL DETERMINANTS OF HEALTH (SDOH)
WITHIN THE PAST 12 MONTHS, YOU WORRIED THAT YOUR FOOD WOULD RUN OUT BEFORE YOU GOT THE MONEY TO BUY MORE: NEVER TRUE
WITHIN THE LAST YEAR, HAVE TO BEEN RAPED OR FORCED TO HAVE ANY KIND OF SEXUAL ACTIVITY BY YOUR PARTNER OR EX-PARTNER?: NO
WITHIN THE LAST YEAR, HAVE YOU BEEN HUMILIATED OR EMOTIONALLY ABUSED IN OTHER WAYS BY YOUR PARTNER OR EX-PARTNER?: NO
IN THE PAST 12 MONTHS, HAS THE ELECTRIC, GAS, OIL, OR WATER COMPANY THREATENED TO SHUT OFF SERVICE IN YOUR HOME?: NO
WITHIN THE LAST YEAR, HAVE YOU BEEN KICKED, HIT, SLAPPED, OR OTHERWISE PHYSICALLY HURT BY YOUR PARTNER OR EX-PARTNER?: NO
WITHIN THE PAST 12 MONTHS, THE FOOD YOU BOUGHT JUST DIDN'T LAST AND YOU DIDN'T HAVE MONEY TO GET MORE: NEVER TRUE
WITHIN THE LAST YEAR, HAVE YOU BEEN AFRAID OF YOUR PARTNER OR EX-PARTNER?: NO

## 2024-12-17 ENCOUNTER — ANESTHESIA (OUTPATIENT)
Dept: OBGYN | Facility: MEDICAL CENTER | Age: 39
End: 2024-12-17
Payer: COMMERCIAL

## 2024-12-17 ENCOUNTER — ANESTHESIA EVENT (OUTPATIENT)
Dept: OBGYN | Facility: MEDICAL CENTER | Age: 39
End: 2024-12-17
Payer: COMMERCIAL

## 2024-12-17 LAB
BASOPHILS # BLD AUTO: 0.3 % (ref 0–1.8)
BASOPHILS # BLD: 0.02 K/UL (ref 0–0.12)
EOSINOPHIL # BLD AUTO: 0.02 K/UL (ref 0–0.51)
EOSINOPHIL NFR BLD: 0.3 % (ref 0–6.9)
ERYTHROCYTE [DISTWIDTH] IN BLOOD BY AUTOMATED COUNT: 43.6 FL (ref 35.9–50)
HCT VFR BLD AUTO: 34.1 % (ref 37–47)
HGB BLD-MCNC: 11.3 G/DL (ref 12–16)
HOLDING TUBE BB 8507: NORMAL
IMM GRANULOCYTES # BLD AUTO: 0.02 K/UL (ref 0–0.11)
IMM GRANULOCYTES NFR BLD AUTO: 0.3 % (ref 0–0.9)
LYMPHOCYTES # BLD AUTO: 1.62 K/UL (ref 1–4.8)
LYMPHOCYTES NFR BLD: 24.7 % (ref 22–41)
MCH RBC QN AUTO: 28.4 PG (ref 27–33)
MCHC RBC AUTO-ENTMCNC: 33.1 G/DL (ref 32.2–35.5)
MCV RBC AUTO: 85.7 FL (ref 81.4–97.8)
MONOCYTES # BLD AUTO: 0.33 K/UL (ref 0–0.85)
MONOCYTES NFR BLD AUTO: 5 % (ref 0–13.4)
NEUTROPHILS # BLD AUTO: 4.54 K/UL (ref 1.82–7.42)
NEUTROPHILS NFR BLD: 69.4 % (ref 44–72)
NRBC # BLD AUTO: 0 K/UL
NRBC BLD-RTO: 0 /100 WBC (ref 0–0.2)
PLATELET # BLD AUTO: 173 K/UL (ref 164–446)
PMV BLD AUTO: 13.5 FL (ref 9–12.9)
RBC # BLD AUTO: 3.98 M/UL (ref 4.2–5.4)
T PALLIDUM AB SER QL IA: NORMAL
WBC # BLD AUTO: 6.6 K/UL (ref 4.8–10.8)

## 2024-12-17 PROCEDURE — 160029 HCHG SURGERY MINUTES - 1ST 30 MINS LEVEL 4: Performed by: OBSTETRICS & GYNECOLOGY

## 2024-12-17 PROCEDURE — 86780 TREPONEMA PALLIDUM: CPT

## 2024-12-17 PROCEDURE — A9270 NON-COVERED ITEM OR SERVICE: HCPCS | Performed by: OBSTETRICS & GYNECOLOGY

## 2024-12-17 PROCEDURE — 700105 HCHG RX REV CODE 258: Performed by: STUDENT IN AN ORGANIZED HEALTH CARE EDUCATION/TRAINING PROGRAM

## 2024-12-17 PROCEDURE — 85025 COMPLETE CBC W/AUTO DIFF WBC: CPT

## 2024-12-17 PROCEDURE — 10907ZC DRAINAGE OF AMNIOTIC FLUID, THERAPEUTIC FROM PRODUCTS OF CONCEPTION, VIA NATURAL OR ARTIFICIAL OPENING: ICD-10-PCS | Performed by: OBSTETRICS & GYNECOLOGY

## 2024-12-17 PROCEDURE — 88302 TISSUE EXAM BY PATHOLOGIST: CPT

## 2024-12-17 PROCEDURE — 160002 HCHG RECOVERY MINUTES (STAT): Performed by: OBSTETRICS & GYNECOLOGY

## 2024-12-17 PROCEDURE — 700102 HCHG RX REV CODE 250 W/ 637 OVERRIDE(OP): Performed by: STUDENT IN AN ORGANIZED HEALTH CARE EDUCATION/TRAINING PROGRAM

## 2024-12-17 PROCEDURE — 700101 HCHG RX REV CODE 250: Performed by: STUDENT IN AN ORGANIZED HEALTH CARE EDUCATION/TRAINING PROGRAM

## 2024-12-17 PROCEDURE — 770002 HCHG ROOM/CARE - OB PRIVATE (112)

## 2024-12-17 PROCEDURE — 700102 HCHG RX REV CODE 250 W/ 637 OVERRIDE(OP): Performed by: OBSTETRICS & GYNECOLOGY

## 2024-12-17 PROCEDURE — 160041 HCHG SURGERY MINUTES - EA ADDL 1 MIN LEVEL 4: Performed by: OBSTETRICS & GYNECOLOGY

## 2024-12-17 PROCEDURE — 160009 HCHG ANES TIME/MIN: Performed by: OBSTETRICS & GYNECOLOGY

## 2024-12-17 PROCEDURE — A9270 NON-COVERED ITEM OR SERVICE: HCPCS | Performed by: STUDENT IN AN ORGANIZED HEALTH CARE EDUCATION/TRAINING PROGRAM

## 2024-12-17 PROCEDURE — 160048 HCHG OR STATISTICAL LEVEL 1-5: Performed by: OBSTETRICS & GYNECOLOGY

## 2024-12-17 PROCEDURE — 700111 HCHG RX REV CODE 636 W/ 250 OVERRIDE (IP): Mod: JZ | Performed by: STUDENT IN AN ORGANIZED HEALTH CARE EDUCATION/TRAINING PROGRAM

## 2024-12-17 PROCEDURE — 700111 HCHG RX REV CODE 636 W/ 250 OVERRIDE (IP): Performed by: OBSTETRICS & GYNECOLOGY

## 2024-12-17 PROCEDURE — 700105 HCHG RX REV CODE 258: Performed by: OBSTETRICS & GYNECOLOGY

## 2024-12-17 PROCEDURE — 0UB70ZZ EXCISION OF BILATERAL FALLOPIAN TUBES, OPEN APPROACH: ICD-10-PCS | Performed by: OBSTETRICS & GYNECOLOGY

## 2024-12-17 PROCEDURE — C1755 CATHETER, INTRASPINAL: HCPCS | Performed by: OBSTETRICS & GYNECOLOGY

## 2024-12-17 PROCEDURE — 160035 HCHG PACU - 1ST 60 MINS PHASE I: Performed by: OBSTETRICS & GYNECOLOGY

## 2024-12-17 PROCEDURE — 36415 COLL VENOUS BLD VENIPUNCTURE: CPT

## 2024-12-17 RX ORDER — ONDANSETRON 2 MG/ML
4 INJECTION INTRAMUSCULAR; INTRAVENOUS EVERY 6 HOURS PRN
Status: DISCONTINUED | OUTPATIENT
Start: 2024-12-18 | End: 2024-12-20 | Stop reason: HOSPADM

## 2024-12-17 RX ORDER — OXYCODONE HYDROCHLORIDE 10 MG/1
10 TABLET ORAL EVERY 4 HOURS PRN
Status: DISCONTINUED | OUTPATIENT
Start: 2024-12-18 | End: 2024-12-20 | Stop reason: HOSPADM

## 2024-12-17 RX ORDER — KETOROLAC TROMETHAMINE 30 MG/ML
INJECTION, SOLUTION INTRAMUSCULAR; INTRAVENOUS PRN
Status: DISCONTINUED | OUTPATIENT
Start: 2024-12-17 | End: 2024-12-17 | Stop reason: SURG

## 2024-12-17 RX ORDER — MORPHINE SULFATE 0.5 MG/ML
INJECTION, SOLUTION EPIDURAL; INTRATHECAL; INTRAVENOUS PRN
Status: DISCONTINUED | OUTPATIENT
Start: 2024-12-17 | End: 2024-12-17 | Stop reason: SURG

## 2024-12-17 RX ORDER — DIPHENHYDRAMINE HYDROCHLORIDE 50 MG/ML
12.5 INJECTION INTRAMUSCULAR; INTRAVENOUS
Status: DISCONTINUED | OUTPATIENT
Start: 2024-12-17 | End: 2024-12-17

## 2024-12-17 RX ORDER — EPHEDRINE SULFATE 50 MG/ML
5 INJECTION, SOLUTION INTRAVENOUS
Status: DISCONTINUED | OUTPATIENT
Start: 2024-12-17 | End: 2024-12-17

## 2024-12-17 RX ORDER — SODIUM CHLORIDE, SODIUM LACTATE, POTASSIUM CHLORIDE, AND CALCIUM CHLORIDE .6; .31; .03; .02 G/100ML; G/100ML; G/100ML; G/100ML
1000 INJECTION, SOLUTION INTRAVENOUS ONCE
Status: DISCONTINUED | OUTPATIENT
Start: 2024-12-17 | End: 2024-12-17 | Stop reason: HOSPADM

## 2024-12-17 RX ORDER — ACETAMINOPHEN 500 MG
1000 TABLET ORAL EVERY 6 HOURS
Status: DISCONTINUED | OUTPATIENT
Start: 2024-12-18 | End: 2024-12-20 | Stop reason: HOSPADM

## 2024-12-17 RX ORDER — OXYCODONE HCL 5 MG/5 ML
10 SOLUTION, ORAL ORAL
Status: DISCONTINUED | OUTPATIENT
Start: 2024-12-17 | End: 2024-12-17 | Stop reason: HOSPADM

## 2024-12-17 RX ORDER — ACETAMINOPHEN 500 MG
1000 TABLET ORAL EVERY 6 HOURS
Status: COMPLETED | OUTPATIENT
Start: 2024-12-17 | End: 2024-12-18

## 2024-12-17 RX ORDER — NIFEDIPINE 10 MG/1
10 CAPSULE ORAL
Status: DISCONTINUED | OUTPATIENT
Start: 2024-12-17 | End: 2024-12-20 | Stop reason: HOSPADM

## 2024-12-17 RX ORDER — VITAMIN A ACETATE, BETA CAROTENE, ASCORBIC ACID, CHOLECALCIFEROL, .ALPHA.-TOCOPHEROL ACETATE, DL-, THIAMINE MONONITRATE, RIBOFLAVIN, NIACINAMIDE, PYRIDOXINE HYDROCHLORIDE, FOLIC ACID, CYANOCOBALAMIN, CALCIUM CARBONATE, FERROUS FUMARATE, ZINC OXIDE, CUPRIC OXIDE 3080; 12; 120; 400; 1; 1.84; 3; 20; 22; 920; 25; 200; 27; 10; 2 [IU]/1; UG/1; MG/1; [IU]/1; MG/1; MG/1; MG/1; MG/1; MG/1; [IU]/1; MG/1; MG/1; MG/1; MG/1; MG/1
1 TABLET, FILM COATED ORAL
Status: DISCONTINUED | OUTPATIENT
Start: 2024-12-18 | End: 2024-12-20 | Stop reason: HOSPADM

## 2024-12-17 RX ORDER — CEFAZOLIN SODIUM 1 G/3ML
INJECTION, POWDER, FOR SOLUTION INTRAMUSCULAR; INTRAVENOUS PRN
Status: DISCONTINUED | OUTPATIENT
Start: 2024-12-17 | End: 2024-12-17 | Stop reason: SURG

## 2024-12-17 RX ORDER — ONDANSETRON 4 MG/1
4 TABLET, ORALLY DISINTEGRATING ORAL EVERY 6 HOURS PRN
Status: DISCONTINUED | OUTPATIENT
Start: 2024-12-18 | End: 2024-12-20 | Stop reason: HOSPADM

## 2024-12-17 RX ORDER — METOCLOPRAMIDE HYDROCHLORIDE 5 MG/ML
10 INJECTION INTRAMUSCULAR; INTRAVENOUS EVERY 6 HOURS PRN
Status: DISCONTINUED | OUTPATIENT
Start: 2024-12-18 | End: 2024-12-20 | Stop reason: HOSPADM

## 2024-12-17 RX ORDER — BUPIVACAINE HYDROCHLORIDE 7.5 MG/ML
INJECTION, SOLUTION INTRASPINAL
Status: COMPLETED | OUTPATIENT
Start: 2024-12-17 | End: 2024-12-17

## 2024-12-17 RX ORDER — OXYCODONE HCL 5 MG/5 ML
5 SOLUTION, ORAL ORAL
Status: DISCONTINUED | OUTPATIENT
Start: 2024-12-17 | End: 2024-12-17 | Stop reason: HOSPADM

## 2024-12-17 RX ORDER — ACETAMINOPHEN 500 MG
1000 TABLET ORAL ONCE
Status: COMPLETED | OUTPATIENT
Start: 2024-12-17 | End: 2024-12-17

## 2024-12-17 RX ORDER — METOCLOPRAMIDE HYDROCHLORIDE 5 MG/ML
10 INJECTION INTRAMUSCULAR; INTRAVENOUS ONCE
Status: COMPLETED | OUTPATIENT
Start: 2024-12-17 | End: 2024-12-17

## 2024-12-17 RX ORDER — LABETALOL 300 MG/1
300 TABLET, FILM COATED ORAL TWICE DAILY
Status: DISCONTINUED | OUTPATIENT
Start: 2024-12-17 | End: 2024-12-17

## 2024-12-17 RX ORDER — CITRIC ACID/SODIUM CITRATE 334-500MG
30 SOLUTION, ORAL ORAL ONCE
Status: COMPLETED | OUTPATIENT
Start: 2024-12-17 | End: 2024-12-17

## 2024-12-17 RX ORDER — EPHEDRINE SULFATE 50 MG/ML
10 INJECTION, SOLUTION INTRAVENOUS
Status: ACTIVE | OUTPATIENT
Start: 2024-12-17 | End: 2024-12-18

## 2024-12-17 RX ORDER — OXYCODONE HYDROCHLORIDE 5 MG/1
5 TABLET ORAL EVERY 4 HOURS PRN
Status: DISCONTINUED | OUTPATIENT
Start: 2024-12-18 | End: 2024-12-20 | Stop reason: HOSPADM

## 2024-12-17 RX ORDER — CALCIUM CARBONATE 500 MG/1
1000 TABLET, CHEWABLE ORAL EVERY 6 HOURS PRN
Status: DISCONTINUED | OUTPATIENT
Start: 2024-12-17 | End: 2024-12-20 | Stop reason: HOSPADM

## 2024-12-17 RX ORDER — HYDRALAZINE HYDROCHLORIDE 20 MG/ML
5 INJECTION INTRAMUSCULAR; INTRAVENOUS
Status: DISCONTINUED | OUTPATIENT
Start: 2024-12-17 | End: 2024-12-17 | Stop reason: HOSPADM

## 2024-12-17 RX ORDER — ONDANSETRON 2 MG/ML
INJECTION INTRAMUSCULAR; INTRAVENOUS PRN
Status: DISCONTINUED | OUTPATIENT
Start: 2024-12-17 | End: 2024-12-17

## 2024-12-17 RX ORDER — HYDROMORPHONE HYDROCHLORIDE 1 MG/ML
0.4 INJECTION, SOLUTION INTRAMUSCULAR; INTRAVENOUS; SUBCUTANEOUS
Status: ACTIVE | OUTPATIENT
Start: 2024-12-17 | End: 2024-12-18

## 2024-12-17 RX ORDER — SODIUM CHLORIDE, SODIUM LACTATE, POTASSIUM CHLORIDE, CALCIUM CHLORIDE 600; 310; 30; 20 MG/100ML; MG/100ML; MG/100ML; MG/100ML
INJECTION, SOLUTION INTRAVENOUS CONTINUOUS
Status: DISCONTINUED | OUTPATIENT
Start: 2024-12-17 | End: 2024-12-17

## 2024-12-17 RX ORDER — SIMETHICONE 125 MG
125 TABLET,CHEWABLE ORAL 4 TIMES DAILY PRN
Status: DISCONTINUED | OUTPATIENT
Start: 2024-12-17 | End: 2024-12-20 | Stop reason: HOSPADM

## 2024-12-17 RX ORDER — SODIUM CHLORIDE, SODIUM LACTATE, POTASSIUM CHLORIDE, CALCIUM CHLORIDE 600; 310; 30; 20 MG/100ML; MG/100ML; MG/100ML; MG/100ML
2000 INJECTION, SOLUTION INTRAVENOUS PRN
Status: DISCONTINUED | OUTPATIENT
Start: 2024-12-17 | End: 2024-12-20 | Stop reason: HOSPADM

## 2024-12-17 RX ORDER — DIPHENHYDRAMINE HYDROCHLORIDE 50 MG/ML
12.5 INJECTION INTRAMUSCULAR; INTRAVENOUS EVERY 6 HOURS PRN
Status: ACTIVE | OUTPATIENT
Start: 2024-12-17 | End: 2024-12-18

## 2024-12-17 RX ORDER — DEXAMETHASONE SODIUM PHOSPHATE 4 MG/ML
INJECTION, SOLUTION INTRA-ARTICULAR; INTRALESIONAL; INTRAMUSCULAR; INTRAVENOUS; SOFT TISSUE PRN
Status: DISCONTINUED | OUTPATIENT
Start: 2024-12-17 | End: 2024-12-17 | Stop reason: SURG

## 2024-12-17 RX ORDER — CEFAZOLIN SODIUM 1 G/3ML
2 INJECTION, POWDER, FOR SOLUTION INTRAMUSCULAR; INTRAVENOUS ONCE
Status: DISCONTINUED | OUTPATIENT
Start: 2024-12-17 | End: 2024-12-17 | Stop reason: HOSPADM

## 2024-12-17 RX ORDER — DIPHENHYDRAMINE HYDROCHLORIDE 50 MG/ML
25 INJECTION INTRAMUSCULAR; INTRAVENOUS EVERY 6 HOURS PRN
Status: ACTIVE | OUTPATIENT
Start: 2024-12-17 | End: 2024-12-18

## 2024-12-17 RX ORDER — OXYTOCIN 10 [USP'U]/ML
10 INJECTION, SOLUTION INTRAMUSCULAR; INTRAVENOUS
Status: DISCONTINUED | OUTPATIENT
Start: 2024-12-17 | End: 2024-12-20 | Stop reason: HOSPADM

## 2024-12-17 RX ORDER — ACETAMINOPHEN 500 MG
1000 TABLET ORAL EVERY 6 HOURS PRN
Status: DISCONTINUED | OUTPATIENT
Start: 2024-12-22 | End: 2024-12-20

## 2024-12-17 RX ORDER — NIFEDIPINE 10 MG/1
10 CAPSULE ORAL ONCE
Status: COMPLETED | OUTPATIENT
Start: 2024-12-17 | End: 2024-12-17

## 2024-12-17 RX ORDER — EPHEDRINE SULFATE 50 MG/ML
INJECTION, SOLUTION INTRAVENOUS PRN
Status: DISCONTINUED | OUTPATIENT
Start: 2024-12-17 | End: 2024-12-17 | Stop reason: SURG

## 2024-12-17 RX ORDER — IBUPROFEN 800 MG/1
800 TABLET, FILM COATED ORAL EVERY 8 HOURS PRN
Status: DISCONTINUED | OUTPATIENT
Start: 2024-12-22 | End: 2024-12-20

## 2024-12-17 RX ORDER — DEXMEDETOMIDINE HYDROCHLORIDE 100 UG/ML
INJECTION, SOLUTION INTRAVENOUS PRN
Status: DISCONTINUED | OUTPATIENT
Start: 2024-12-17 | End: 2024-12-17 | Stop reason: SURG

## 2024-12-17 RX ORDER — HYDROMORPHONE HYDROCHLORIDE 1 MG/ML
0.1 INJECTION, SOLUTION INTRAMUSCULAR; INTRAVENOUS; SUBCUTANEOUS
Status: DISCONTINUED | OUTPATIENT
Start: 2024-12-17 | End: 2024-12-17 | Stop reason: HOSPADM

## 2024-12-17 RX ORDER — OXYCODONE HYDROCHLORIDE 5 MG/1
5 TABLET ORAL EVERY 4 HOURS PRN
Status: ACTIVE | OUTPATIENT
Start: 2024-12-17 | End: 2024-12-18

## 2024-12-17 RX ORDER — IBUPROFEN 800 MG/1
800 TABLET, FILM COATED ORAL EVERY 8 HOURS
Status: DISCONTINUED | OUTPATIENT
Start: 2024-12-18 | End: 2024-12-20 | Stop reason: HOSPADM

## 2024-12-17 RX ORDER — SODIUM CHLORIDE 9 MG/ML
INJECTION, SOLUTION INTRAVENOUS ONCE
Status: DISCONTINUED | OUTPATIENT
Start: 2024-12-17 | End: 2024-12-18

## 2024-12-17 RX ORDER — LABETALOL HYDROCHLORIDE 5 MG/ML
5 INJECTION, SOLUTION INTRAVENOUS
Status: DISCONTINUED | OUTPATIENT
Start: 2024-12-17 | End: 2024-12-17 | Stop reason: HOSPADM

## 2024-12-17 RX ORDER — HYDROMORPHONE HYDROCHLORIDE 1 MG/ML
0.2 INJECTION, SOLUTION INTRAMUSCULAR; INTRAVENOUS; SUBCUTANEOUS
Status: ACTIVE | OUTPATIENT
Start: 2024-12-17 | End: 2024-12-18

## 2024-12-17 RX ORDER — DOCUSATE SODIUM 100 MG/1
100 CAPSULE, LIQUID FILLED ORAL 2 TIMES DAILY PRN
Status: DISCONTINUED | OUTPATIENT
Start: 2024-12-17 | End: 2024-12-20 | Stop reason: HOSPADM

## 2024-12-17 RX ORDER — DIPHENHYDRAMINE HCL 25 MG
25 TABLET ORAL EVERY 6 HOURS PRN
Status: DISCONTINUED | OUTPATIENT
Start: 2024-12-18 | End: 2024-12-20 | Stop reason: HOSPADM

## 2024-12-17 RX ORDER — KETOROLAC TROMETHAMINE 15 MG/ML
15 INJECTION, SOLUTION INTRAMUSCULAR; INTRAVENOUS EVERY 6 HOURS
Status: COMPLETED | OUTPATIENT
Start: 2024-12-18 | End: 2024-12-18

## 2024-12-17 RX ORDER — DIPHENHYDRAMINE HYDROCHLORIDE 50 MG/ML
12.5 INJECTION INTRAMUSCULAR; INTRAVENOUS EVERY 6 HOURS PRN
Status: DISCONTINUED | OUTPATIENT
Start: 2024-12-17 | End: 2024-12-17

## 2024-12-17 RX ORDER — OXYCODONE HYDROCHLORIDE 10 MG/1
10 TABLET ORAL EVERY 4 HOURS PRN
Status: ACTIVE | OUTPATIENT
Start: 2024-12-17 | End: 2024-12-18

## 2024-12-17 RX ORDER — ONDANSETRON 2 MG/ML
4 INJECTION INTRAMUSCULAR; INTRAVENOUS
Status: DISCONTINUED | OUTPATIENT
Start: 2024-12-17 | End: 2024-12-17

## 2024-12-17 RX ORDER — ONDANSETRON 2 MG/ML
4 INJECTION INTRAMUSCULAR; INTRAVENOUS EVERY 6 HOURS PRN
Status: ACTIVE | OUTPATIENT
Start: 2024-12-17 | End: 2024-12-18

## 2024-12-17 RX ORDER — SODIUM CHLORIDE, SODIUM GLUCONATE, SODIUM ACETATE, POTASSIUM CHLORIDE AND MAGNESIUM CHLORIDE 526; 502; 368; 37; 30 MG/100ML; MG/100ML; MG/100ML; MG/100ML; MG/100ML
INJECTION, SOLUTION INTRAVENOUS
Status: DISCONTINUED | OUTPATIENT
Start: 2024-12-17 | End: 2024-12-17 | Stop reason: SURG

## 2024-12-17 RX ORDER — HYDROMORPHONE HYDROCHLORIDE 1 MG/ML
0.2 INJECTION, SOLUTION INTRAMUSCULAR; INTRAVENOUS; SUBCUTANEOUS
Status: DISCONTINUED | OUTPATIENT
Start: 2024-12-17 | End: 2024-12-17 | Stop reason: HOSPADM

## 2024-12-17 RX ORDER — HYDROMORPHONE HYDROCHLORIDE 1 MG/ML
0.4 INJECTION, SOLUTION INTRAMUSCULAR; INTRAVENOUS; SUBCUTANEOUS
Status: DISCONTINUED | OUTPATIENT
Start: 2024-12-17 | End: 2024-12-17 | Stop reason: HOSPADM

## 2024-12-17 RX ORDER — DIPHENHYDRAMINE HYDROCHLORIDE 50 MG/ML
25 INJECTION INTRAMUSCULAR; INTRAVENOUS EVERY 6 HOURS PRN
Status: DISCONTINUED | OUTPATIENT
Start: 2024-12-18 | End: 2024-12-20 | Stop reason: HOSPADM

## 2024-12-17 RX ORDER — PHENYLEPHRINE HYDROCHLORIDE 10 MG/ML
INJECTION, SOLUTION INTRAMUSCULAR; INTRAVENOUS; SUBCUTANEOUS PRN
Status: DISCONTINUED | OUTPATIENT
Start: 2024-12-17 | End: 2024-12-17 | Stop reason: SURG

## 2024-12-17 RX ORDER — SODIUM CHLORIDE, SODIUM LACTATE, POTASSIUM CHLORIDE, CALCIUM CHLORIDE 600; 310; 30; 20 MG/100ML; MG/100ML; MG/100ML; MG/100ML
INJECTION, SOLUTION INTRAVENOUS CONTINUOUS
Status: DISCONTINUED | OUTPATIENT
Start: 2024-12-17 | End: 2024-12-18

## 2024-12-17 RX ADMIN — PROPOFOL 5 MG: 10 INJECTION, EMULSION INTRAVENOUS at 18:34

## 2024-12-17 RX ADMIN — PHENYLEPHRINE HYDROCHLORIDE 100 MCG: 10 INJECTION INTRAVENOUS at 17:37

## 2024-12-17 RX ADMIN — EPHEDRINE SULFATE 5 MG: 50 INJECTION, SOLUTION INTRAVENOUS at 17:38

## 2024-12-17 RX ADMIN — ACETAMINOPHEN 1000 MG: 500 TABLET ORAL at 02:01

## 2024-12-17 RX ADMIN — OXYTOCIN 125 ML/HR: 10 INJECTION, SOLUTION INTRAMUSCULAR; INTRAVENOUS at 20:13

## 2024-12-17 RX ADMIN — SODIUM CITRATE AND CITRIC ACID MONOHYDRATE 30 ML: 334; 500 SOLUTION ORAL at 17:04

## 2024-12-17 RX ADMIN — PHENYLEPHRINE HYDROCHLORIDE 100 MCG: 10 INJECTION INTRAVENOUS at 17:40

## 2024-12-17 RX ADMIN — LABETALOL HYDROCHLORIDE 300 MG: 300 TABLET, FILM COATED ORAL at 06:49

## 2024-12-17 RX ADMIN — PROPOFOL 5 MG: 10 INJECTION, EMULSION INTRAVENOUS at 18:37

## 2024-12-17 RX ADMIN — OXYTOCIN 3 UNITS: 10 INJECTION, SOLUTION INTRAMUSCULAR; INTRAVENOUS at 17:56

## 2024-12-17 RX ADMIN — FAMOTIDINE 20 MG: 10 INJECTION, SOLUTION INTRAVENOUS at 17:05

## 2024-12-17 RX ADMIN — GLYCOPYRROLATE 0.1 MG: 0.2 INJECTION INTRAMUSCULAR; INTRAVENOUS at 17:38

## 2024-12-17 RX ADMIN — MORPHINE SULFATE 150 MCG: 0.5 INJECTION, SOLUTION EPIDURAL; INTRATHECAL; INTRAVENOUS at 17:32

## 2024-12-17 RX ADMIN — PROPOFOL 5 MG: 10 INJECTION, EMULSION INTRAVENOUS at 18:39

## 2024-12-17 RX ADMIN — BUPIVACAINE HYDROCHLORIDE IN DEXTROSE 1.5 ML: 7.5 INJECTION, SOLUTION SUBARACHNOID at 17:32

## 2024-12-17 RX ADMIN — EPHEDRINE SULFATE 5 MG: 50 INJECTION, SOLUTION INTRAVENOUS at 17:41

## 2024-12-17 RX ADMIN — EPHEDRINE SULFATE 5 MG: 50 INJECTION, SOLUTION INTRAVENOUS at 17:35

## 2024-12-17 RX ADMIN — PHENYLEPHRINE HYDROCHLORIDE 10 MCG/MIN: 10 INJECTION INTRAVENOUS at 17:33

## 2024-12-17 RX ADMIN — SODIUM CHLORIDE, SODIUM GLUCONATE, SODIUM ACETATE, POTASSIUM CHLORIDE AND MAGNESIUM CHLORIDE: 526; 502; 368; 37; 30 INJECTION, SOLUTION INTRAVENOUS at 17:23

## 2024-12-17 RX ADMIN — DEXAMETHASONE SODIUM PHOSPHATE 6 MG: 4 INJECTION INTRA-ARTICULAR; INTRALESIONAL; INTRAMUSCULAR; INTRAVENOUS; SOFT TISSUE at 17:35

## 2024-12-17 RX ADMIN — PROPOFOL 5 MG: 10 INJECTION, EMULSION INTRAVENOUS at 18:30

## 2024-12-17 RX ADMIN — PHENYLEPHRINE HYDROCHLORIDE 100 MCG: 10 INJECTION INTRAVENOUS at 17:39

## 2024-12-17 RX ADMIN — NIFEDIPINE 10 MG: 10 CAPSULE ORAL at 15:30

## 2024-12-17 RX ADMIN — ACETAMINOPHEN 1000 MG: 500 TABLET ORAL at 19:44

## 2024-12-17 RX ADMIN — KETOROLAC TROMETHAMINE 15 MG: 30 INJECTION, SOLUTION INTRAMUSCULAR; INTRAVENOUS at 18:30

## 2024-12-17 RX ADMIN — PROPOFOL 5 MG: 10 INJECTION, EMULSION INTRAVENOUS at 18:32

## 2024-12-17 RX ADMIN — ONDANSETRON 4 MG: 2 INJECTION INTRAMUSCULAR; INTRAVENOUS at 17:28

## 2024-12-17 RX ADMIN — DEXMEDETOMIDINE HYDROCHLORIDE 4 MCG: 100 INJECTION, SOLUTION INTRAVENOUS at 18:23

## 2024-12-17 RX ADMIN — EPHEDRINE SULFATE 5 MG: 50 INJECTION, SOLUTION INTRAVENOUS at 17:42

## 2024-12-17 RX ADMIN — PROPOFOL 5 MG: 10 INJECTION, EMULSION INTRAVENOUS at 18:26

## 2024-12-17 RX ADMIN — CEFAZOLIN 2 G: 1 INJECTION, POWDER, FOR SOLUTION INTRAMUSCULAR; INTRAVENOUS at 17:35

## 2024-12-17 RX ADMIN — NIFEDIPINE 10 MG: 10 CAPSULE ORAL at 06:49

## 2024-12-17 RX ADMIN — METOCLOPRAMIDE HYDROCHLORIDE 10 MG: 5 INJECTION INTRAMUSCULAR; INTRAVENOUS at 17:05

## 2024-12-17 RX ADMIN — DEXMEDETOMIDINE HYDROCHLORIDE 4 MCG: 100 INJECTION, SOLUTION INTRAVENOUS at 18:26

## 2024-12-17 RX ADMIN — FENTANYL CITRATE 15 MCG: 50 INJECTION, SOLUTION INTRAMUSCULAR; INTRAVENOUS at 17:32

## 2024-12-17 RX ADMIN — PHENYLEPHRINE HYDROCHLORIDE 50 MCG: 10 INJECTION INTRAVENOUS at 17:50

## 2024-12-17 RX ADMIN — OXYTOCIN 1000 ML: 10 INJECTION, SOLUTION INTRAMUSCULAR; INTRAVENOUS at 17:57

## 2024-12-17 RX ADMIN — PHENYLEPHRINE HYDROCHLORIDE 100 MCG: 10 INJECTION INTRAVENOUS at 17:42

## 2024-12-17 ASSESSMENT — PAIN DESCRIPTION - PAIN TYPE
TYPE: ACUTE PAIN
TYPE: ACUTE PAIN;SURGICAL PAIN
TYPE: ACUTE PAIN

## 2024-12-17 NOTE — PROGRESS NOTES
06 - Report received from Екатерина TORRES, care assumed.     1300 - Report given to Zuri TORRES, POC discussed, care transferred.

## 2024-12-17 NOTE — PROGRESS NOTES
1300: Report from Alexus ANDERSON RN     1512: Dr. Morley notified of elevated BP readings- orders received     1721: Pt ambulated to OR1     1755: Viable female infant delivered via repeat  section. APGARs 8/9    1846: Pt transferred to PACU3 via [facility]     184: Bedside report from Dr. Carpenter   Pt A&Ox4, no nausea or pain at this time     : Pt transferred to postpartum via [facility], infant in arms     : Bedside report to Estefania CHAIREZ RN   ID and cuddle band verified 2 RN

## 2024-12-17 NOTE — H&P
DATE OF ADMISSION:  2024     ADMITTING DIAGNOSES:   1.  Intrauterine pregnancy at 35 and 6/7th weeks.  2.  Gestational hypertension.  3.  Prior  section.  4.  Desires permanent sterility.  5.  Advanced maternal age.  6.  History of a LEEP procedure.     HISTORY OF PRESENT ILLNESS:  This is a 38-year-old  3, para 1 with an   estimated date of confinement of 2025 established by an 8-week   ultrasound, who presented to labor and delivery this evening after being sent   over from our office for elevated blood pressures during a routine tummy check   appointment.  She had been to the dentist earlier in the day and was noted to   have elevated blood pressures.  She came in for ultrasound and visit and was   noted to have blood pressures in the 150s/100s and was sent to labor and   delivery.  The patient was completely asymptomatic as far as headaches, blurry   vision.  DTRs were 2+.  She had no clonus.  She had an ultrasound performed,   which showed normal KALI.  Fetus was vertex and estimated fetal weight was 2713   grams.  The patient has been evaluated on labor and delivery and her   laboratory studies are normal.  Her blood pressures did require   antihypertensive protocol in the form of nifedipine IR 10 mg x2 secondary to BPs of 170s/100s. and she has   just received labetalol 200 mg p.o.  Her pressures are now in the 150s/90s.    She continues to remain asymptomatic and her laboratory studies are normal.    The recommendation for the patient is to be admitted to labor and delivery and   observed overnight and proceed with delivery tomorrow.  She is requesting   that Dr. Morley perform her surgery and she will be consented for a repeat    section and bilateral salpingectomy.  The pros, cons, risks and   benefits of these procedures have been reviewed with her and include, but are   not limited to, bleeding, infection, damage to bowel, bladder, nerves, the   baby or other organs,  need for life-saving blood transfusion or hysterectomy,   complications with future pregnancies, complications with anesthesia and   death, the bilateral salpingectomy carries a risk of a failure rate of 1 in   100, a 50% increased risk of an ectopic pregnancy and issue of regret.  All of   her questions were answered and she will sign consent forms.     Prenatal care has been with Dr. Morley.  Her estimated date of confinement   of 2025 was established by an 8-week ultrasound.     PRENATAL LABS:  Her blood type is A positive, antibody screen negative, RPR   nonreactive, rubella immune, hepatitis B surface antigen negative, hepatitis C   negative, HIV negative.  Her 1-hour Glucola was normal.  Her group B strep   status is negative.  She had a noninvasive  screening and recessive   gene screening performed, both of which were low risk.     Complications with the pregnancy include advanced maternal age.  She had   noninvasive  screening performed, which was low risk.  She has an   anterior fundal placenta with no previa.  Total weight gain for the pregnancy   has been 19 pounds.  She did have a history of preeclampsia with her prior   pregnancy and had been started on aspirin 81 mg 1 p.o. every day.     PAST MEDICAL HISTORY:  ALLERGIES:  She has no known drug allergies.     MEDICATIONS:  Include prenatal vitamins and aspirin 81 mg p.o. at bedtime.     PAST MEDICAL HISTORY:  Significant for anxiety.     PAST SURGICAL HISTORY:  She has had a  section.  She had a LEEP   procedure for chronic low-grade dysplasia.  She had laparoscopic adhesiolysis   and chromopertubation.  She had widespread pelvic adhesions, but no   endometriosis.  Her left fallopian tube was patent.  She did not have spill   from her right fallopian tube.     SOCIAL HISTORY:  She was a smoker and was encouraged to quit.  She denies   alcohol, IV drug use and narcotic abuse.     FAMILY HISTORY:  She has a maternal  grandmother with colon cancer.  Her   maternal grandmother has breast cancer.  Her mother was negative for the BRCA   gene.  The breast cancer was diagnosed at age 39.     GYNECOLOGIC HISTORY:  She has no history of any HSV.  She did undergo a LEEP   procedure for chronic LGSIL in .     OBSTETRICAL HISTORY:   1 was a primary  section at 34 weeks   secondary to preeclampsia.  There was an attempt made for a vaginal delivery,   but her cervix did not dilate.   2 was a miscarriage of a very early   SAB of a chemical pregnancy.   3 is her current pregnancy.     PHYSICAL EXAMINATION:    VITAL SIGNS:  Temperature is 97, pulse is 69, blood pressures range systolic   from 159-179, diastolic pressures from 83-93.  GENERAL:  She is pleasant, in no apparent distress.  ABDOMEN:  Gravid and nontender.  EXTREMITIES:  Show trace pedal edema.  She has no cords or Homans sign.  DTRs   are 2+ with no clonus.  PELVIC:  Fetal heart tones are in the 140s and a category 1 tracing.    Tocometry shows no contractions.  Estimated fetal weight based on ultrasound   performed today is 2713 grams.  KALI is 15.5 and the fetus is vertex.     LABORATORY STUDIES:  Show a platelet count of 165,000.  AST 18, ALT 18.  Uric   acid 5.6.  Protein creatinine ratio is 181.     IMPRESSION:  This is a 38-year-old  3, para 1 at 35 and 6/7th weeks   with gestational hypertension.  Blood pressures in the severe range who is   responding well to medication, who is doing well.     PLAN:    1.  The patient will be admitted to labor and delivery with a plan for repeat    section and bilateral salpingectomy tomorrow at 1600 hours.  2.  There is currently reassuring fetal surveillance.  3.  The patient will sign consent forms.  4.  Blood pressures will be monitored closely and if there is any derangement   of her pressures overnight, we will proceed with delivery this evening.  5.  There is currently reassuring fetal  surveillance.  6.  The patient will be n.p.o. at 0600 hours on 12/17/2024.        ______________________________  MD JACINDA ADAMES/NERY    DD:  12/16/2024 20:07  DT:  12/16/2024 21:53    Job#:  [id number]    CC:DEBOAR BRUNER MD

## 2024-12-17 NOTE — PROGRESS NOTES
, due , 35w6d    1734 Pt presents to L&D after being sent from the office of [facility] for high BP readings. Pt denies any HA, RUQ pain, abnormal swelling or visual symptoms. Pt denies any CTX, LOF or VB and reports +FM. EFM & TOCO applied. Initial /89, repeat 175/91. Call to Dr Alford, report given. Orders received, see MAR. Pt NPO at this time.      Dr Alford updated on pt BP.     Dr Alford updated on pt BP, orders received. See MAR.     1900 Report given to BRIAN Shea. POC discussed, care relinquished.

## 2024-12-17 NOTE — PROGRESS NOTES
2040: Report received from Monse TORRES, care assumed.    2100: Pt denies ctx, LOF, bleeding, reports + FM. Pt denies epigastric pain, visucla changes, pt reports mild HA that she stated is related to not eating anything this evening.     0155: Pt reports 5/10 HA, pt denies visual changes/epigastric pain. Pt educated to call RN if HA is not relieved by tylenol in 1 hour from admin.    0415: Pt report she no longer has a KING.     0645: Dr Alford aware of 2 severe range Bps, orders received to admin 10 mg procardia IR orally and 300 mg PO labetolol per MD.    0655: Report given to Alexus TORRES, care relinquished.

## 2024-12-17 NOTE — PROGRESS NOTES
1700 Report received from Celina TORRES.     4629 Prashanth BETTENCOURT at bedside. POC discussed. Order received to give Labetalol 200mg now (See MAR).

## 2024-12-17 NOTE — PROGRESS NOTES
Called by RN for severe range BP.  Pt given Nifedipine IR 10 mg PO X 1 and 300 mg Labetalol .  Pt is NPO except for clears. Monitor BP.

## 2024-12-18 LAB
ALBUMIN SERPL BCP-MCNC: 3.2 G/DL (ref 3.2–4.9)
ALBUMIN/GLOB SERPL: 1.2 G/DL
ALP SERPL-CCNC: 114 U/L (ref 30–99)
ALT SERPL-CCNC: 17 U/L (ref 2–50)
ANION GAP SERPL CALC-SCNC: 11 MMOL/L (ref 7–16)
AST SERPL-CCNC: 26 U/L (ref 12–45)
BILIRUB SERPL-MCNC: 0.4 MG/DL (ref 0.1–1.5)
BUN SERPL-MCNC: 7 MG/DL (ref 8–22)
CALCIUM ALBUM COR SERPL-MCNC: 9.6 MG/DL (ref 8.5–10.5)
CALCIUM SERPL-MCNC: 9 MG/DL (ref 8.5–10.5)
CHLORIDE SERPL-SCNC: 97 MMOL/L (ref 96–112)
CO2 SERPL-SCNC: 24 MMOL/L (ref 20–33)
CREAT SERPL-MCNC: 0.64 MG/DL (ref 0.5–1.4)
ERYTHROCYTE [DISTWIDTH] IN BLOOD BY AUTOMATED COUNT: 45.1 FL (ref 35.9–50)
GFR SERPLBLD CREATININE-BSD FMLA CKD-EPI: 115 ML/MIN/1.73 M 2
GLOBULIN SER CALC-MCNC: 2.7 G/DL (ref 1.9–3.5)
GLUCOSE SERPL-MCNC: 89 MG/DL (ref 65–99)
HCT VFR BLD AUTO: 23.6 % (ref 37–47)
HGB BLD-MCNC: 7.8 G/DL (ref 12–16)
MCH RBC QN AUTO: 29.1 PG (ref 27–33)
MCHC RBC AUTO-ENTMCNC: 33.1 G/DL (ref 32.2–35.5)
MCV RBC AUTO: 88.1 FL (ref 81.4–97.8)
PATHOLOGY CONSULT NOTE: NORMAL
PLATELET # BLD AUTO: 160 K/UL (ref 164–446)
PMV BLD AUTO: 12.9 FL (ref 9–12.9)
POTASSIUM SERPL-SCNC: 2.9 MMOL/L (ref 3.6–5.5)
PROT SERPL-MCNC: 5.9 G/DL (ref 6–8.2)
RBC # BLD AUTO: 2.68 M/UL (ref 4.2–5.4)
SODIUM SERPL-SCNC: 132 MMOL/L (ref 135–145)
URATE SERPL-MCNC: 6.2 MG/DL (ref 1.9–8.2)
WBC # BLD AUTO: 8.3 K/UL (ref 4.8–10.8)

## 2024-12-18 PROCEDURE — A9270 NON-COVERED ITEM OR SERVICE: HCPCS | Performed by: OBSTETRICS & GYNECOLOGY

## 2024-12-18 PROCEDURE — 80053 COMPREHEN METABOLIC PANEL: CPT

## 2024-12-18 PROCEDURE — 700102 HCHG RX REV CODE 250 W/ 637 OVERRIDE(OP): Performed by: OBSTETRICS & GYNECOLOGY

## 2024-12-18 PROCEDURE — 700111 HCHG RX REV CODE 636 W/ 250 OVERRIDE (IP): Mod: JZ | Performed by: STUDENT IN AN ORGANIZED HEALTH CARE EDUCATION/TRAINING PROGRAM

## 2024-12-18 PROCEDURE — 36415 COLL VENOUS BLD VENIPUNCTURE: CPT

## 2024-12-18 PROCEDURE — 770002 HCHG ROOM/CARE - OB PRIVATE (112)

## 2024-12-18 PROCEDURE — 700102 HCHG RX REV CODE 250 W/ 637 OVERRIDE(OP): Performed by: STUDENT IN AN ORGANIZED HEALTH CARE EDUCATION/TRAINING PROGRAM

## 2024-12-18 PROCEDURE — A9270 NON-COVERED ITEM OR SERVICE: HCPCS | Performed by: STUDENT IN AN ORGANIZED HEALTH CARE EDUCATION/TRAINING PROGRAM

## 2024-12-18 PROCEDURE — 85027 COMPLETE CBC AUTOMATED: CPT

## 2024-12-18 PROCEDURE — 84550 ASSAY OF BLOOD/URIC ACID: CPT

## 2024-12-18 RX ORDER — POTASSIUM CHLORIDE 1500 MG/1
20 TABLET, EXTENDED RELEASE ORAL 3 TIMES DAILY
Status: DISCONTINUED | OUTPATIENT
Start: 2024-12-18 | End: 2024-12-19

## 2024-12-18 RX ADMIN — SIMETHICONE 125 MG: 125 TABLET, CHEWABLE ORAL at 08:54

## 2024-12-18 RX ADMIN — ACETAMINOPHEN 1000 MG: 500 TABLET ORAL at 15:15

## 2024-12-18 RX ADMIN — ACETAMINOPHEN 1000 MG: 500 TABLET ORAL at 08:42

## 2024-12-18 RX ADMIN — KETOROLAC TROMETHAMINE 15 MG: 15 INJECTION, SOLUTION INTRAMUSCULAR; INTRAVENOUS at 08:43

## 2024-12-18 RX ADMIN — ACETAMINOPHEN 1000 MG: 500 TABLET ORAL at 01:15

## 2024-12-18 RX ADMIN — MAGNESIUM HYDROXIDE 30 ML: 1200 LIQUID ORAL at 22:17

## 2024-12-18 RX ADMIN — SIMETHICONE 125 MG: 125 TABLET, CHEWABLE ORAL at 01:17

## 2024-12-18 RX ADMIN — KETOROLAC TROMETHAMINE 15 MG: 15 INJECTION, SOLUTION INTRAMUSCULAR; INTRAVENOUS at 01:15

## 2024-12-18 RX ADMIN — Medication 1 TABLET: at 17:38

## 2024-12-18 RX ADMIN — ACETAMINOPHEN 1000 MG: 500 TABLET ORAL at 21:01

## 2024-12-18 RX ADMIN — KETOROLAC TROMETHAMINE 15 MG: 15 INJECTION, SOLUTION INTRAMUSCULAR; INTRAVENOUS at 15:15

## 2024-12-18 RX ADMIN — POTASSIUM CHLORIDE 20 MEQ: 1500 TABLET, EXTENDED RELEASE ORAL at 11:37

## 2024-12-18 RX ADMIN — POTASSIUM CHLORIDE 20 MEQ: 1500 TABLET, EXTENDED RELEASE ORAL at 18:56

## 2024-12-18 RX ADMIN — DOCUSATE SODIUM 100 MG: 100 CAPSULE, LIQUID FILLED ORAL at 10:05

## 2024-12-18 RX ADMIN — KETOROLAC TROMETHAMINE 15 MG: 15 INJECTION, SOLUTION INTRAMUSCULAR; INTRAVENOUS at 21:00

## 2024-12-18 RX ADMIN — PRENATAL WITH FERROUS FUM AND FOLIC ACID 1 TABLET: 3080; 920; 120; 400; 22; 1.84; 3; 20; 10; 1; 12; 200; 27; 25; 2 TABLET ORAL at 08:42

## 2024-12-18 RX ADMIN — Medication 1 TABLET: at 08:42

## 2024-12-18 ASSESSMENT — EDINBURGH POSTNATAL DEPRESSION SCALE (EPDS)
I HAVE FELT SAD OR MISERABLE: NO, NOT AT ALL
I HAVE BLAMED MYSELF UNNECESSARILY WHEN THINGS WENT WRONG: NOT VERY OFTEN
I HAVE BEEN SO UNHAPPY THAT I HAVE BEEN CRYING: NO, NEVER
I HAVE FELT SCARED OR PANICKY FOR NO GOOD REASON: NO, NOT MUCH
I HAVE BEEN SO UNHAPPY THAT I HAVE HAD DIFFICULTY SLEEPING: NOT AT ALL
THINGS HAVE BEEN GETTING ON TOP OF ME: NO, MOST OF THE TIME I HAVE COPED QUITE WELL
I HAVE BEEN ABLE TO LAUGH AND SEE THE FUNNY SIDE OF THINGS: AS MUCH AS I ALWAYS COULD
THE THOUGHT OF HARMING MYSELF HAS OCCURRED TO ME: NEVER
I HAVE BEEN ANXIOUS OR WORRIED FOR NO GOOD REASON: YES, SOMETIMES
I HAVE LOOKED FORWARD WITH ENJOYMENT TO THINGS: RATHER LESS THAN I USED TO

## 2024-12-18 ASSESSMENT — PAIN DESCRIPTION - PAIN TYPE
TYPE: ACUTE PAIN;SURGICAL PAIN
TYPE: SURGICAL PAIN
TYPE: ACUTE PAIN;SURGICAL PAIN

## 2024-12-18 NOTE — ANESTHESIA PROCEDURE NOTES
Spinal Block    Date/Time: 12/17/2024 5:32 PM    Performed by: Naima Carpenter M.D.  Authorized by: Naima Carpenter M.D.    Start Time:  12/17/2024 5:32 PM  Reason for Block: primary anesthetic    patient identified, IV checked, site marked, risks and benefits discussed, surgical consent, monitors and equipment checked, pre-op evaluation and timeout performed    Patient Position:  Sitting  Prep: ChloraPrep, patient draped and sterile technique    Monitoring:  Blood pressure, continuous pulse oximetry and heart rate  Approach:  Midline  Location:  L3-4  Injection Technique:  Single-shot  Skin infiltration:  Lidocaine  Strength:  1%  Dose:  3ml  Needle Type:  Pencan  Needle Gauge:  25 G  CSF flowing pre/post injection:  Yes  Sensory Level:  T10   Smooth first pass spinal with return of clear CSF, no paresthesias, no resistance to injection

## 2024-12-18 NOTE — ANESTHESIA TIME REPORT
Anesthesia Start and Stop Event Times       Date Time Event    12/17/2024 1712 Ready for Procedure     1723 Anesthesia Start     1850 Anesthesia Stop          Responsible Staff  12/17/24      Name Role Begin End    Naima Carpenter M.D. Anesth 1723 1850          Overtime Reason:  no overtime (within assigned shift)    Comments:

## 2024-12-18 NOTE — OP REPORT
DATE OF SERVICE:  2024     OPERATIONS:  1.  Repeat low transverse  section.  2.  Bilateral partial salpingectomy.     SURGEON:  Joe Morley MD     ASSISTANT:  Julia Shanks MD     ANESTHESIOLOGIST:  Naima Carpenter MD     ANESTHESIA:  Spinal.     PREOPERATIVE DIAGNOSES:  1.  A 36 and 0/7th week gestation.  2.  Gestational hypertension with severe range blood pressures.  3.  Previous  section.  4.  Request for sterilization.     POSTOPERATIVE DIAGNOSES:  1.  A 36 and 0/7th week gestation.  2.  Gestational hypertension with severe range blood pressures.  3.  Previous  section.  4.  Request for sterilization.  5.  Pelvic and abdominal wall adhesions.  6.  Nuchal cord.     COMPLICATIONS:  None.     ESTIMATED BLOOD LOSS:  900 mL.     SPECIMENS SENT TO PATHOLOGY:  Left proximal fallopian tube and right distal   fallopian tube.     FINDINGS:  Baby--- female, 1 minute Apgar 8, 5 minute Apgar 9, weight 2540 grams.     INDICATIONS:  This 38-year-old lady is now .  She was admitted yesterday   with new onset gestational hypertension with severe range blood pressures.    Her platelet count was 165,000.  Creatinine 0.5.  Liver enzymes were normal.    Urine protein to creatinine ratio was only 181.  She has a previous .    There was a plan in place for repeat  and bilateral salpingectomy.    She was admitted, treated with oral labetalol, and continued to experience   severely elevated blood pressures in spite of labetalol.  Fetal monitoring was   reassuring.     DESCRIPTION OF PROCEDURE:  The patient went to the OR.  Spinal anesthesia was   administered.  She was prepped and draped.  Timeout was done.  I made a   Pfannenstiel incision through a preexisting Pfannenstiel scar.  I incised the   subcutaneous fat.  I incised the rectus fascia transversely.  I used scissors   and electrocautery to dissect through dense fibrovascular scar tissue between   the rectus fascia and  the rectus muscles.  I entered the peritoneum in the   midline with a hemostat well away from the bladder.  The peritoneal incision   was enlarged.  An Kamaljit O retractor was placed.  Anterior cul-de-sac   adhesions were lysed with Metzenbaum scissors.  I made a low transverse   myometrial incision with a scalpel.  I pierced the membranes with a hemostat   revealing clear fluid.  There was an anterior low lying placenta. Baby's head   was extracted from right occiput transverse position with no difficulty.  I   bulb suctioned the baby's mouth.  I reduced a single loose nuchal cord.    Shoulders and body delivered easily.  Thirty seconds later, the cord was   doubly clamped and cut.  The baby was handed off.  The placenta and trailing   membranes delivered spontaneously.  I sponge curetted the endometrial cavity   to ensure that there were no retained products of conception.  The hysterotomy   extended laterally to the patient's left.  Both lateral aspects of the   hysterotomy incision were secured with a figure-of-eight 0 Vicryl sutures and   the hysterotomy was closed with running full thickness interlocking 0 Vicryl,   obtaining good approximation and hemostasis.     Adnexal adhesions precluded any visualization of the left fimbria.  We were   able to visualize the proximal left fallopian tube.  The LigaSure vessel   sealer was used to seal and divide the left fallopian tube in its mid portion,   and the mesosalpinx was sealed and divided.  The left tube was sealed and   divided just lateral to the uterus and the proximal tube was sent to   pathology.  Good hemostasis was noted.     The right fallopian tube was mobilized, the vessel sealer was used to seal and   divide attachments between the fimbria and the ovary and then seal and divide   the mesosalpinx.  The isthmus of the right fallopian tube was sealed and   divided and the distal two-thirds of the right fallopian tube was removed and   sent to pathology.   Good hemostasis was noted.     All surgical sites were inspected.  Good hemostasis was noted.  The Kamaljit O   retractor was removed.  Seprafilm was applied to the hysterotomy closure site. I closed the anterior parietal peritoneum and the   posterior layer of the rectus sheath with running 2-0 Vicryl.  I placed   Seprafilm adhesion barrier over the rectus abdominis muscle midline.  I closed   both layers of the rectus abdominis fascia with running 0 PDS.  I cauterized   subcutaneous bleeders resulting in good hemostasis.  I closed Lino's fascia   with running 3-0 Vicryl.  I closed the skin with Insorb resorbable   subcutaneous staples, one-half inch wide Steri-Strips and a Mepilex Ag   dressing were placed.  Sponge and needle counts were correct.        ______________________________  MD OLIVIA Stephen/CIARA    DD:  12/17/2024 19:10  DT:  12/17/2024 19:28    Job#:  [id number]    CC:Julia Shanks MD

## 2024-12-18 NOTE — PROGRESS NOTES
Bedside report received from BRIAN Keating. Pt in bed resting comfortably at this time. Pt has henderson in place, has not yet ambulated, denies need for pain medication at this time. Pt states that she will call if additional pain medication is needed. Whiteboard updated. POC discussed. Call light within reach. All questions and concerns answered at this time, advised to call for assistance as needed.

## 2024-12-18 NOTE — PROGRESS NOTES
2920- Assessment completed. Fundus firm, lochia light. Abd. incisions with mepilex silver dressing intact. Plan of care reviewed. Denies pain at this time, will call if pain med intervention needed. Call light within reach, bed at lowest position, and shows no signs of distress at this time.    2290- Notified Dr. Morley regarding CMP results. MD will order potassium.

## 2024-12-18 NOTE — PROGRESS NOTES
"POD #1 s/p RLTCS and bilateral salpingectomy.    PREOPERATIVE DIAGNOSES:  1.  A 36 and 0/7th week gestation.  2.  Gestational hypertension with severe range blood pressures.  3.  Previous  section.  4.  Request for sterilization.     POSTOPERATIVE DIAGNOSES:  1.  A 36 and 0/7th week gestation.  2.  Gestational hypertension with severe range blood pressures.  3.  Previous  section.  4.  Request for sterilization.  5.  Pelvic and abdominal wall adhesions.  6.  Nuchal cord.    S: Doing well.  Pain is well controlled.  She is having epigastric pain as well as some gas pain.  She denies headaches, nausea/vomiting, and RUQ pain.  She denies vision changes.  She has moderate lochia.  She has not really ambulated.    O:  /75   Pulse 88   Temp 36.8 °C (98.2 °F) (Temporal)   Resp 18   Ht 1.575 m (5' 2\")   Wt 86.6 kg (191 lb)   SpO2 93%     A, A, and O x 3 NAD    FF U/1    No c/c/e    Incision: clean/dry/intact.  Mepilex dressing in place.    Recent Labs     12/16/24  1814 12/17/24  1330 12/18/24  0446   WBC 7.0 6.6 8.3   RBC 3.64* 3.98* 2.68*   HEMOGLOBIN 10.3* 11.3* 7.8*   HEMATOCRIT 31.6* 34.1* 23.6*   MCV 86.8 85.7 88.1   MCH 28.3 28.4 29.1   RDW 43.8 43.6 45.1   PLATELETCT 165 173 160*   MPV 12.5 13.5* 12.9   NEUTSPOLYS 65.90 69.40  --    LYMPHOCYTES 26.00 24.70  --    MONOCYTES 6.70 5.00  --    EOSINOPHILS 0.60 0.30  --    BASOPHILS 0.40 0.30  --      A/P: POD #1 s/p  RLTCS and bilateral salpingectomy for gestational hypertension and now with moderate blood loss anemia and now with gas pain/epigastric pain.     Begin Folitab.  Ambulate TID.  Repeat preeclampsia labs and check LFTs.  Monitor blood pressure - at this time stable off antihypertensives.  Continue cares.  ADAT.  "

## 2024-12-18 NOTE — ANESTHESIA PREPROCEDURE EVALUATION
Case: [id number] Date/Time: 24 173    Procedure: REPEAT CAESAREAN SECTION, BILATERAL SALPINGECTOMY    Pre-op diagnosis: Prior , Preclampsia    Location: [facility] OR 01 / [facility]GERY LABOR AND DELIVERY    Surgeons: Joe Morley M.D.            Relevant Problems   No relevant active problems      presenting for C/S in the setting of consistently elevated BP.  No other preeclampsia symptoms.      Physical Exam    Airway   Mallampati: II  TM distance: >3 FB  Neck ROM: full       Cardiovascular - normal exam  Rhythm: regular  Rate: normal  (-) murmur     Dental - normal exam           Pulmonary - normal exam  Breath sounds clear to auscultation     Abdominal    Neurological - normal exam                 Anesthesia Plan    ASA 2       Plan - spinal               Induction: intravenous    Postoperative Plan: Postoperative administration of opioids is intended.    Pertinent diagnostic labs and testing reviewed    Informed Consent:    Anesthetic plan and risks discussed with patient.    Use of blood products discussed with: patient whom consented to blood products.

## 2024-12-18 NOTE — ANESTHESIA POSTPROCEDURE EVALUATION
Patient: Tina Crocker    Procedure Summary       Date: 24 Room / Location: [facility] OR 01 / SUR[facility]ERY LABOR AND DELIVERY    Anesthesia Start:  Anesthesia Stop:     Procedure: REPEAT CAESAREAN SECTION, BILATERAL SALPINGECTOMY (Abdomen) Diagnosis:       Status post repeat low transverse  section      (Prior , Preclampsia)    Surgeons: Joe Morley M.D. Responsible Provider: Naima Carpenter M.D.    Anesthesia Type: general ASA Status: 2            Final Anesthesia Type: general  Last vitals  BP   Blood Pressure: 126/82    Temp   37.2 °C (98.9 °F)    Pulse   74   Resp   18    SpO2   94 %      Anesthesia Post Evaluation    Patient location during evaluation: PACU  Patient participation: complete - patient participated  Level of consciousness: awake and alert    Airway patency: patent  Anesthetic complications: no  Cardiovascular status: hemodynamically stable  Respiratory status: acceptable  Hydration status: euvolemic    PONV: none        There were no known notable events for this encounter.     Nurse Pain Score: 4 (NPRS)

## 2024-12-18 NOTE — LACTATION NOTE
Initial Lactation Consultation:    Met with Tina and baby Cynthia to provide lactation support.     36 week delivery for Gestational Hypertension. Latch attempts, but no optimal feedings, as of yet. Tina has been hand expressing and feeding back 0.5-0.75mL following attempts at breast. Infant blood sugars remain stable.     Cynthia is Tina's second baby. She exclusively pumped to provide breast milk for her first child, due to pre-maturity and a NICU stay. She reports a sufficient milk supply with her first child. Lactation risk factors include: SGA/LPI status, gestational hypertension, AMA.    Infant presents with feeding cues at this time. She is placed skin-to-skin with her mother, where she is able to achieve a latch in football hold. Non-nutritive suck pattern noted, with infant very sleepy at breast.     Recommendation made to initiate three-step feeding plan. Patient requests time to continue with skin-to-skin care prior to pump/supplement initiation. She agrees to call for RN/LC assistance when she is ready to begin pumping.     Three-step feeding plan, as follows:    Cue-based breastfeeding attempts, at least once every three hours, for a total of 8+ feedings per 24 hours.  Provide supplementation every three hours, via paced bottle feeding, as per supplemental feeding guidelines. Offer expressed breast milk first, if available, then follow with volumes of formula/DBM to meet total volume required.  Pump breasts with double electric breast pump following each supplemental bottle feeding.    Tina is provided with the opportunity to ask questions. These have been answered to her satisfaction. She is encouraged to call RN/lactation for additional breastfeeding assistance, as needed, throughout remainder of hospital stay.

## 2024-12-18 NOTE — OR SURGEON
Immediate Post OP Note    PreOp Diagnosis:     36 0/7 wk gestation  Gestational hypertension with severe range BPs  Prior Csection  Request for sterilization      PostOp Diagnosis:     Same, plus  Pelvic adhesions  Nuchal cord      Procedure(s):  REPEAT CAESAREAN SECTION, BILATERAL PARTIAL SALPINGECTOMY    Surgeon(s):  KANU Corbett M.D.    Anesthesiologist/Type of Anesthesia:  Anesthesiologist: Naima Carpenter M.D./* No anesthesia type entered *    Surgical Staff:  Circulator: Zuri Hernandez R.N.  Scrub Person: Marci Fitzgerald  L&СВЕТЛАНА Baby  Nurse: Shelly Ribera R.N.    Specimens removed if any:   Left proximal fallopian tube, right distal fallopian tube    Estimated Blood Loss: 900 CC    Findings: adhesions between fascia and rectus mm; ant. CDS adhesions; left adnexal adhesions    Complications: none        12/17/2024 6:49 PM Joe Morley M.D.

## 2024-12-18 NOTE — CARE PLAN
The patient is Stable - Low risk of patient condition declining or worsening    Shift Goals  Clinical Goals: VSS, fundus firm with light lochia, pain control  Patient Goals: healthy mom & healthy baby  Family Goals: support    Progress made toward(s) clinical / shift goals:    Problem: Psychosocial - Postpartum  Goal: Patient will verbalize and demonstrate effective bonding and parenting behavior  Outcome: Progressing  Note: MOB bonding well with baby, providing all cares at this time, calls for assistance as needed.      Problem: Altered Physiologic Condition  Goal: Patient physiologically stable as evidenced by normal lochia, palpable uterine involution and vitals within normal limits  Outcome: Progressing  Note: Fundus firm with light lochia, diuresing well.        Patient is not progressing towards the following goals:

## 2024-12-18 NOTE — DISCHARGE PLANNING
Discharge Planning Assessment Post Partum    Reason for Referral: History of anxiety   Address: [address] [place] [address] [place] [address]  Phone: [phone]  Type of Living Situation: stable housing   Mom Diagnosis: Pregnancy,    Baby Diagnosis: [place]-36 weeks   Primary Language: English     Name of Baby: Cynthia Crocker (: 24)  Father of the Baby: Hilario Crocker   Involved in baby’s care? Yes  Contact Information: [phone]    Prenatal Care: Yes-Dr. Morley   Mom's PCP: No PCP listed   PCP for new baby: Dr. Colletti     Support System: FOB   Coping/Bonding between mother & baby: Yes  Source of Feeding: breast feeding   Supplies for Infant: prepared for infant     Mom's Insurance: [employer]   Baby Covered on Insurance:Yes  Mother Employed/School: Yes  Other children in the home/names & ages: 8 year old sonCrissy     Financial Hardship/Income: No   Mom's Mental status: alert and oriented   Services used prior to admit: None     CPS History: No  Psychiatric History: History of anxiety.  Discussed PPD and anxiety and offered resources.  Domestic Violence History: No  Drug/ETOH History: No    Resources Provided: Offered resources, however parents are well-prepared for infant and deny any needs  Referrals Made: None      Clearance for Discharge: Infant is cleared to discharge home with parents once medically cleared

## 2024-12-19 LAB
BASOPHILS # BLD AUTO: 0.4 % (ref 0–1.8)
BASOPHILS # BLD: 0.03 K/UL (ref 0–0.12)
EOSINOPHIL # BLD AUTO: 0.03 K/UL (ref 0–0.51)
EOSINOPHIL NFR BLD: 0.4 % (ref 0–6.9)
ERYTHROCYTE [DISTWIDTH] IN BLOOD BY AUTOMATED COUNT: 44.3 FL (ref 35.9–50)
HCT VFR BLD AUTO: 22.8 % (ref 37–47)
HGB BLD-MCNC: 7.3 G/DL (ref 12–16)
IMM GRANULOCYTES # BLD AUTO: 0.06 K/UL (ref 0–0.11)
IMM GRANULOCYTES NFR BLD AUTO: 0.8 % (ref 0–0.9)
LYMPHOCYTES # BLD AUTO: 1.87 K/UL (ref 1–4.8)
LYMPHOCYTES NFR BLD: 24.7 % (ref 22–41)
MCH RBC QN AUTO: 27.8 PG (ref 27–33)
MCHC RBC AUTO-ENTMCNC: 32 G/DL (ref 32.2–35.5)
MCV RBC AUTO: 86.7 FL (ref 81.4–97.8)
MONOCYTES # BLD AUTO: 0.53 K/UL (ref 0–0.85)
MONOCYTES NFR BLD AUTO: 7 % (ref 0–13.4)
NEUTROPHILS # BLD AUTO: 5.06 K/UL (ref 1.82–7.42)
NEUTROPHILS NFR BLD: 66.7 % (ref 44–72)
NRBC # BLD AUTO: 0 K/UL
NRBC BLD-RTO: 0 /100 WBC (ref 0–0.2)
PLATELET # BLD AUTO: 174 K/UL (ref 164–446)
PMV BLD AUTO: 12.3 FL (ref 9–12.9)
POTASSIUM SERPL-SCNC: 2.8 MMOL/L (ref 3.6–5.5)
RBC # BLD AUTO: 2.63 M/UL (ref 4.2–5.4)
WBC # BLD AUTO: 7.6 K/UL (ref 4.8–10.8)

## 2024-12-19 PROCEDURE — A9270 NON-COVERED ITEM OR SERVICE: HCPCS | Performed by: OBSTETRICS & GYNECOLOGY

## 2024-12-19 PROCEDURE — 85025 COMPLETE CBC W/AUTO DIFF WBC: CPT

## 2024-12-19 PROCEDURE — 770002 HCHG ROOM/CARE - OB PRIVATE (112)

## 2024-12-19 PROCEDURE — 700102 HCHG RX REV CODE 250 W/ 637 OVERRIDE(OP): Performed by: OBSTETRICS & GYNECOLOGY

## 2024-12-19 PROCEDURE — 36415 COLL VENOUS BLD VENIPUNCTURE: CPT

## 2024-12-19 PROCEDURE — 84132 ASSAY OF SERUM POTASSIUM: CPT

## 2024-12-19 RX ORDER — POTASSIUM CHLORIDE 1500 MG/1
40 TABLET, EXTENDED RELEASE ORAL 3 TIMES DAILY
Status: DISCONTINUED | OUTPATIENT
Start: 2024-12-19 | End: 2024-12-20

## 2024-12-19 RX ORDER — POTASSIUM CHLORIDE 1500 MG/1
40 TABLET, EXTENDED RELEASE ORAL ONCE
Status: COMPLETED | OUTPATIENT
Start: 2024-12-19 | End: 2024-12-19

## 2024-12-19 RX ORDER — NIFEDIPINE 10 MG/1
10 CAPSULE ORAL ONCE
Status: COMPLETED | OUTPATIENT
Start: 2024-12-19 | End: 2024-12-19

## 2024-12-19 RX ORDER — NIFEDIPINE 30 MG/1
30 TABLET, EXTENDED RELEASE ORAL
Status: DISCONTINUED | OUTPATIENT
Start: 2024-12-19 | End: 2024-12-20 | Stop reason: HOSPADM

## 2024-12-19 RX ADMIN — ACETAMINOPHEN 1000 MG: 500 TABLET ORAL at 16:13

## 2024-12-19 RX ADMIN — ACETAMINOPHEN 1000 MG: 500 TABLET ORAL at 03:49

## 2024-12-19 RX ADMIN — IBUPROFEN 800 MG: 800 TABLET, FILM COATED ORAL at 20:31

## 2024-12-19 RX ADMIN — ACETAMINOPHEN 1000 MG: 500 TABLET ORAL at 22:11

## 2024-12-19 RX ADMIN — Medication 1 TABLET: at 18:13

## 2024-12-19 RX ADMIN — ACETAMINOPHEN 1000 MG: 500 TABLET ORAL at 10:01

## 2024-12-19 RX ADMIN — Medication 1 TABLET: at 07:40

## 2024-12-19 RX ADMIN — NIFEDIPINE 10 MG: 10 CAPSULE ORAL at 18:48

## 2024-12-19 RX ADMIN — POTASSIUM CHLORIDE 40 MEQ: 1500 TABLET, EXTENDED RELEASE ORAL at 18:13

## 2024-12-19 RX ADMIN — IBUPROFEN 800 MG: 800 TABLET, FILM COATED ORAL at 03:49

## 2024-12-19 RX ADMIN — POTASSIUM CHLORIDE 40 MEQ: 1500 TABLET, EXTENDED RELEASE ORAL at 11:58

## 2024-12-19 RX ADMIN — PRENATAL WITH FERROUS FUM AND FOLIC ACID 1 TABLET: 3080; 920; 120; 400; 22; 1.84; 3; 20; 10; 1; 12; 200; 27; 25; 2 TABLET ORAL at 07:40

## 2024-12-19 RX ADMIN — IBUPROFEN 800 MG: 800 TABLET, FILM COATED ORAL at 11:59

## 2024-12-19 RX ADMIN — POTASSIUM CHLORIDE 40 MEQ: 1500 TABLET, EXTENDED RELEASE ORAL at 06:07

## 2024-12-19 RX ADMIN — NIFEDIPINE 30 MG: 30 TABLET, FILM COATED, EXTENDED RELEASE ORAL at 16:54

## 2024-12-19 ASSESSMENT — PAIN DESCRIPTION - PAIN TYPE
TYPE: ACUTE PAIN
TYPE: ACUTE PAIN;SURGICAL PAIN

## 2024-12-19 NOTE — PROGRESS NOTES
0800- Assessment completed. Fundus firm, lochia light. Abd. incisions with mepilex silver dressing intact. Plan of care reviewed. Denies pain at this time, will call if pain med intervention needed. Call light within reach, bed at lowest position, and shows no signs of distress at this time.    1627- Patient BP going up. Called Dr. Morley and MD gave verbal orders for NIFEdipine SR 30mg. Hold if SBP<110, but no less than parameter for HR. Read back. Orders placed.    1823- Attempted to call Dr. Morley. Messaged sent.    1828- Orders to give NIFEdipine IR 10mg pox1. Orders placed.

## 2024-12-19 NOTE — CARE PLAN
The patient is Stable - Low risk of patient condition declining or worsening    Shift Goals  Clinical Goals: Pain control, Vitals signs WDL, Lochia WDL  Patient Goals:   Family Goals:     Progress made toward(s) clinical / shift goals:    Problem: Bowel Elimination - Post Surgical  Goal: Patient will resume regular bowel sounds and function with no discomfort or distention  Outcome: Progressing  Note: Patient states having a small bowel movement today.     Problem: Altered Physiologic Condition  Goal: Patient physiologically stable as evidenced by normal lochia, palpable uterine involution and vitals within normal limits  Outcome: Progressing  Note: Patient's fundus firm, one below umbilicus with scant bleeding through out this shift.

## 2024-12-19 NOTE — CARE PLAN
The patient is Stable - Low risk of patient condition declining or worsening    Shift Goals  Clinical Goals: Maintain stable vitals  Patient Goals: healthy mom & healthy baby  Family Goals: support    Progress made toward(s) clinical / shift goals:    Problem: Knowledge Deficit - Postpartum  Goal: Patient will verbalize and demonstrate understanding of self and infant care  Description: Target End Date:  1-3 days or as soon as patient condition allows    Document in Patient Education    1.  Assess patient and knowledge of self and infant care  2.  Educate patient verbally, by demonstration and written material  Outcome: Progressing     Problem: Psychosocial - Postpartum  Goal: Patient will verbalize and demonstrate effective bonding and parenting behavior  Description: Target End Date:  1 to 4 days    1.  Assess patient for anxiety or apprehension regarding parenting role  2.  Provide emotional support and encouragement to patient/family/caregiver  Outcome: Progressing     Problem: Infection - Postpartum  Goal: Postpartum patient will be free of signs and symptoms of infection  Description: Target End Date:  Target End Date:  1 to 4 days  1.  Instruct patient in pericare/incisional care  2.  Give antibiotics as indicated an ordered  3.  Get patient out of bed and ambulating as ordered  Outcome: Progressing     Problem: Bowel Elimination - Post Surgical  Goal: Patient will resume regular bowel sounds and function with no discomfort or distention  Description: Target End Date:  1 - 3 days post op    1.  Monitor bowel sounds every shift  2.  Assess for flatus  3.  Monitor for abdominal distention  4.  Monitor for abdominal discomfort  5.  Initiate bowel protocol on post op day 2 if no bowel movement  6.  Note date of last BM  7.  Educate about diet, fluid intake, medication and activity to promote bowel function  8.  Educate signs and symptoms of constipation and interventions to implement  9.  Pharmacologic bowel  management per provider order  10. Regular toileting schedule  11. Upright position for toileting  12. High fiber diet  13. Encourage hydration  14. Collaborate with Clinical Dietician  15. Care and maintenance of ostomy if applicable  Outcome: Progressing

## 2024-12-19 NOTE — LACTATION NOTE
Follow up:    Cynthia continues to be unable to achieve optimal latch at breast. She has continued to grasp breast for very short durations, but is not able to sustain latch. Following discussion of risks and benefits, Tina requests trial of nipple shield to assist with this. Fitted with 24mm shield. Infant able to sustain latch with shield, but still somewhat disorganized at breast.  Recommended continuation of three-step feeding plan, utilizing paced bottle feeding technique. Tina has been pumping breasts and expressing >5mL per pump session. She is aware of infant's need for increasing volumes of milk. She will notify RN for receipt of donor milk/formula, if she is unable to express volume required per feeding (utilize supplemental feeding guidelines for direction as to volumes).

## 2024-12-19 NOTE — PROGRESS NOTES
2100: Assessment completed, fundus firm, lochia light, incision intact with dressing. Plan of care reviewed.    0545: Call placed to Dr. Morley to report lab results. Informded that patient reports no symptoms at this time. Orders received to increase 0600 Kdur dose to 40mEq and continue remaining doses as ordered. Orders received to redraw CBC and Serum Potassium at 0300 12/20/24. Orders read back to provider and placed by this RN. Updated POC with patient, verbalized understanding.

## 2024-12-19 NOTE — LACTATION NOTE
This note was copied from a baby's chart.  Follow up:    MOB has continued 3 step plan overnight and baby latched 2x (one latch with nipple shield, one without). Limiting time at breast to 15 min. Baby bottle feeding well, primarily with mothers EBM.  Pump settings currently 80/60 cpm at 2 min.  Suction at 30%.  Total time 15 min on pump.     Assisted with latch on right side in football hold, however baby too sleepy to latch.  Total time at breastfeeding attempt ;  approx 10 min.  Pace bottle fed by LC for a total of 12 cc.  Total time:  approx 5min.  MOB pumped.      Discussed supplemental EBM/DBM should be at 10-20cc today.  If EBM is insufficient, DBM can be used.     MOB planning on using personal Medela pump once discharged home, likely tomorrow.    Please call lactation today for any additnal questions    Plan:  Continue 3 step plan, limit breastfeeding attempt to 15min, discontinue if baby is frustrated at breast and move to step 2.  Recommended to use 24mm nipple shield for now and allow both lips to be flanged at base of nipple shield.

## 2024-12-19 NOTE — PROGRESS NOTES
Post Partum Progress Note    Name:   Tina Crocker   Date/Time:  2024 - 7:11 AM  Chief Admitting Dx:  Labor and delivery indication for care or intervention [O75.9]  Delivery Type:   for repeat/BS  Post-Op/Post Partum Days #:  2  PREOPERATIVE DIAGNOSES:  1.  A 36 and 0/7th week gestation.  2.  Gestational hypertension with severe range blood pressures.  3.  Previous  section.  4.  Request for sterilization.     POSTOPERATIVE DIAGNOSES:  1.  A 36 and 0/7th week gestation.  2.  Gestational hypertension with severe range blood pressures.  3.  Previous  section.  4.  Request for sterilization.  5.  Pelvic and abdominal wall adhesions.  6.  Nuchal cord.    Subjective:  Pt is tired and working on breastfeeding. Pt without PIH sx. Pt with minimal lochia.  Abdominal pain: no  Ambulating:   yes  Tolerating liquids:  yes  Tolerating food:  yes common adult  Flatus:   yes      Vitals:    24 1000 24 1431 24 1731 24 0600   BP: 123/70 137/73 132/84 (!) 142/87   Pulse: 78 74 80 86   Resp:    Temp: 36.8 °C (98.3 °F) 36.3 °C (97.4 °F) 37.1 °C (98.7 °F) 36.7 °C (98 °F)   TempSrc: Temporal Temporal Temporal Temporal   SpO2: 98% 98% 97% 97%   Weight:       Height:           Exam:  Gen: NAD  Abdomen: Abdomen soft, non-tender. BS normal. No masses,  No organomegaly  Fundal Tenderness:  no  Fundus Firm: yes  Incision: dry and intact/mepilex dressing in place  Below umbilicus: yes  Perineum: perineum intact  Lochia: mild  Extremities: 1+ edema extremities, peripheral pulses and reflexes normal    Meds:  Current Facility-Administered Medications   Medication Dose    potassium chloride SA (Kdur) tablet 20 mEq  20 mEq    ferrous sulfate-c-folic acid (Folitab 500) tablet 1 Tablet  1 Tablet    oxytocin (Pitocin) infusion (for post delivery)  125 mL/hr    oxytocin (Pitocin) injection 10 Units  10 Units    lactated ringers infusion  2,000 mL    ibuprofen (Motrin) tablet 800  mg  800 mg    Followed by    [START ON 12/21/2024] ibuprofen (Motrin) tablet 800 mg  800 mg    acetaminophen (Tylenol) tablet 1,000 mg  1,000 mg    Followed by    [START ON 12/22/2024] acetaminophen (Tylenol) tablet 1,000 mg  1,000 mg    oxyCODONE immediate-release (Roxicodone) tablet 5 mg  5 mg    oxyCODONE immediate release (Roxicodone) tablet 10 mg  10 mg    ondansetron (Zofran) syringe/vial injection 4 mg  4 mg    Or    ondansetron (Zofran ODT) dispertab 4 mg  4 mg    diphenhydrAMINE (Benadryl) tablet/capsule 25 mg  25 mg    Or    diphenhydrAMINE (Benadryl) injection 25 mg  25 mg    docusate sodium (Colace) capsule 100 mg  100 mg    tetanus-dipth-acell pertussis (Tdap) inj 0.5 mL  0.5 mL    measles, mumps and rubella vaccine (Mmr) injection 0.5 mL  0.5 mL    metoclopramide (Reglan) injection 10 mg  10 mg    NIFEdipine IR (Procardia) capsule 10 mg  10 mg    magnesium hydroxide (Milk Of Magnesia) suspension 30 mL  30 mL    prenatal plus vitamin (Stuartnatal 1+1) 27-1 MG tablet 1 Tablet  1 Tablet    simethicone (Mylicon) chewable tablet 125 mg  125 mg    calcium carbonate (Tums) chewable tab 1,000 mg  1,000 mg       Labs:   Recent Labs     12/17/24  1330 12/18/24  0446 12/19/24  0409   WBC 6.6 8.3 7.6   RBC 3.98* 2.68* 2.63*   HEMOGLOBIN 11.3* 7.8* 7.3*   HEMATOCRIT 34.1* 23.6* 22.8*   MCV 85.7 88.1 86.7   MCH 28.4 29.1 27.8   MCHC 33.1 33.1 32.0*   RDW 43.6 45.1 44.3   PLATELETCT 173 160* 174   MPV 13.5* 12.9 12.3      Latest Reference Range & Units 12/16/24 18:14 12/18/24 07:52 12/19/24 04:09   Sodium 135 - 145 mmol/L 139 132 (L)    Potassium 3.6 - 5.5 mmol/L 3.2 (L) 2.9 (L) 2.8 (L)   Chloride 96 - 112 mmol/L 103 97    Co2 20 - 33 mmol/L 24 24    Anion Gap 7.0 - 16.0  12.0 11.0    Glucose 65 - 99 mg/dL 113 (H) 89    Bun 8 - 22 mg/dL 6 (L) 7 (L)    Creatinine 0.50 - 1.40 mg/dL 0.48 (L) 0.64    GFR (CKD-EPI) >60 mL/min/1.73 m 2 123 115    Calcium 8.5 - 10.5 mg/dL 9.3 9.0    Correct Calcium 8.5 - 10.5 mg/dL 9.9 9.6     AST(SGOT) 12 - 45 U/L 18 26    ALT(SGPT) 2 - 50 U/L 18 17    Alkaline Phosphatase 30 - 99 U/L 132 (H) 114 (H)    Total Bilirubin 0.1 - 1.5 mg/dL 0.3 0.4    Albumin 3.2 - 4.9 g/dL 3.3 3.2    Total Protein 6.0 - 8.2 g/dL 6.4 5.9 (L)    Globulin 1.9 - 3.5 g/dL 3.1 2.7    A-G Ratio g/dL 1.1 1.2    Uric Acid 1.9 - 8.2 mg/dL 5.6 6.2    (L): Data is abnormally low  (H): Data is abnormally high    Assessment/Plan:  Chief Admitting Dx:  Labor and delivery indication for care or intervention [O75.9]  Delivery Type:   for repeat  Tubal Ligation:  Yes  1-Gestational HTN with severe range blood pressures-pt with stable blood pressures without antihypertensives. No PIH sx. Continue to monitor  2-Iron deficiency anemia-Pt on Folitab one tab BID. Recheck CBC tomorrow am  3-Hypokalemia- Pt on KDur 40 meq this am and continue with 20 meq TID. Will rechek K+ tomorrow am.  4--Postop-stable, CPM. Possible d/c tomorrow.     Carmen Arguello M.D.

## 2024-12-20 ENCOUNTER — PHARMACY VISIT (OUTPATIENT)
Dept: PHARMACY | Facility: MEDICAL CENTER | Age: 39
End: 2024-12-20
Payer: COMMERCIAL

## 2024-12-20 VITALS
HEIGHT: 62 IN | WEIGHT: 191 LBS | DIASTOLIC BLOOD PRESSURE: 83 MMHG | RESPIRATION RATE: 17 BRPM | OXYGEN SATURATION: 97 % | HEART RATE: 97 BPM | SYSTOLIC BLOOD PRESSURE: 134 MMHG | BODY MASS INDEX: 35.15 KG/M2 | TEMPERATURE: 98.7 F

## 2024-12-20 PROBLEM — O13.9 GESTATIONAL HYPERTENSION: Status: ACTIVE | Noted: 2024-12-20

## 2024-12-20 PROBLEM — D62 ANEMIA ASSOCIATED WITH ACUTE BLOOD LOSS: Status: ACTIVE | Noted: 2024-12-20

## 2024-12-20 PROBLEM — Z30.2 REQUEST FOR STERILIZATION: Status: ACTIVE | Noted: 2024-12-20

## 2024-12-20 PROBLEM — E87.6 HYPOKALEMIA DUE TO INADEQUATE POTASSIUM INTAKE: Status: ACTIVE | Noted: 2024-12-20

## 2024-12-20 PROBLEM — N85.A UTERINE SCAR FROM PREVIOUS CESAREAN DELIVERY: Status: ACTIVE | Noted: 2024-12-20

## 2024-12-20 LAB
BASOPHILS # BLD AUTO: 0.8 % (ref 0–1.8)
BASOPHILS # BLD: 0.06 K/UL (ref 0–0.12)
EOSINOPHIL # BLD AUTO: 0.12 K/UL (ref 0–0.51)
EOSINOPHIL NFR BLD: 1.5 % (ref 0–6.9)
ERYTHROCYTE [DISTWIDTH] IN BLOOD BY AUTOMATED COUNT: 45.9 FL (ref 35.9–50)
HCT VFR BLD AUTO: 24.9 % (ref 37–47)
HGB BLD-MCNC: 8.1 G/DL (ref 12–16)
IMM GRANULOCYTES # BLD AUTO: 0.1 K/UL (ref 0–0.11)
IMM GRANULOCYTES NFR BLD AUTO: 1.3 % (ref 0–0.9)
LYMPHOCYTES # BLD AUTO: 2.36 K/UL (ref 1–4.8)
LYMPHOCYTES NFR BLD: 30.4 % (ref 22–41)
MCH RBC QN AUTO: 28.7 PG (ref 27–33)
MCHC RBC AUTO-ENTMCNC: 32.5 G/DL (ref 32.2–35.5)
MCV RBC AUTO: 88.3 FL (ref 81.4–97.8)
MONOCYTES # BLD AUTO: 0.43 K/UL (ref 0–0.85)
MONOCYTES NFR BLD AUTO: 5.5 % (ref 0–13.4)
NEUTROPHILS # BLD AUTO: 4.7 K/UL (ref 1.82–7.42)
NEUTROPHILS NFR BLD: 60.5 % (ref 44–72)
NRBC # BLD AUTO: 0.02 K/UL
NRBC BLD-RTO: 0.3 /100 WBC (ref 0–0.2)
PLATELET # BLD AUTO: 231 K/UL (ref 164–446)
PMV BLD AUTO: 12.4 FL (ref 9–12.9)
POTASSIUM SERPL-SCNC: 3.3 MMOL/L (ref 3.6–5.5)
RBC # BLD AUTO: 2.82 M/UL (ref 4.2–5.4)
WBC # BLD AUTO: 7.8 K/UL (ref 4.8–10.8)

## 2024-12-20 PROCEDURE — RXMED WILLOW AMBULATORY MEDICATION CHARGE: Performed by: OBSTETRICS & GYNECOLOGY

## 2024-12-20 PROCEDURE — 700102 HCHG RX REV CODE 250 W/ 637 OVERRIDE(OP): Performed by: OBSTETRICS & GYNECOLOGY

## 2024-12-20 PROCEDURE — A9270 NON-COVERED ITEM OR SERVICE: HCPCS | Performed by: OBSTETRICS & GYNECOLOGY

## 2024-12-20 PROCEDURE — 85025 COMPLETE CBC W/AUTO DIFF WBC: CPT

## 2024-12-20 PROCEDURE — 36415 COLL VENOUS BLD VENIPUNCTURE: CPT

## 2024-12-20 PROCEDURE — 84132 ASSAY OF SERUM POTASSIUM: CPT

## 2024-12-20 RX ORDER — IBUPROFEN 800 MG/1
800 TABLET, FILM COATED ORAL EVERY 8 HOURS PRN
Qty: 21 TABLET | Refills: 0 | Status: SHIPPED | OUTPATIENT
Start: 2024-12-20 | End: 2024-12-27

## 2024-12-20 RX ORDER — POTASSIUM CHLORIDE 1500 MG/1
20 TABLET, EXTENDED RELEASE ORAL 2 TIMES DAILY
Status: DISCONTINUED | OUTPATIENT
Start: 2024-12-20 | End: 2024-12-20

## 2024-12-20 RX ORDER — POTASSIUM CHLORIDE 1500 MG/1
20 TABLET, EXTENDED RELEASE ORAL 2 TIMES DAILY
Status: DISCONTINUED | OUTPATIENT
Start: 2024-12-20 | End: 2024-12-20 | Stop reason: HOSPADM

## 2024-12-20 RX ORDER — ACETAMINOPHEN 500 MG
500-1000 TABLET ORAL EVERY 6 HOURS PRN
COMMUNITY
Start: 2024-12-20

## 2024-12-20 RX ORDER — NIFEDIPINE 30 MG/1
30 TABLET, EXTENDED RELEASE ORAL DAILY
Qty: 30 TABLET | Refills: 0 | Status: SHIPPED | OUTPATIENT
Start: 2024-12-20 | End: 2025-01-19

## 2024-12-20 RX ORDER — POTASSIUM CHLORIDE 1500 MG/1
20 TABLET, EXTENDED RELEASE ORAL 2 TIMES DAILY
Qty: 30 TABLET | Refills: 0 | Status: SHIPPED | OUTPATIENT
Start: 2024-12-20 | End: 2025-01-04

## 2024-12-20 RX ADMIN — ACETAMINOPHEN 1000 MG: 500 TABLET ORAL at 04:12

## 2024-12-20 RX ADMIN — Medication 1 TABLET: at 07:37

## 2024-12-20 RX ADMIN — IBUPROFEN 800 MG: 800 TABLET, FILM COATED ORAL at 11:40

## 2024-12-20 RX ADMIN — ACETAMINOPHEN 1000 MG: 500 TABLET ORAL at 11:40

## 2024-12-20 RX ADMIN — PRENATAL WITH FERROUS FUM AND FOLIC ACID 1 TABLET: 3080; 920; 120; 400; 22; 1.84; 3; 20; 10; 1; 12; 200; 27; 25; 2 TABLET ORAL at 07:37

## 2024-12-20 RX ADMIN — IBUPROFEN 800 MG: 800 TABLET, FILM COATED ORAL at 04:12

## 2024-12-20 RX ADMIN — POTASSIUM CHLORIDE 20 MEQ: 1500 TABLET, EXTENDED RELEASE ORAL at 06:10

## 2024-12-20 RX ADMIN — NIFEDIPINE 10 MG: 10 CAPSULE ORAL at 00:44

## 2024-12-20 RX ADMIN — NIFEDIPINE 30 MG: 30 TABLET, FILM COATED, EXTENDED RELEASE ORAL at 06:10

## 2024-12-20 ASSESSMENT — PAIN DESCRIPTION - PAIN TYPE
TYPE: ACUTE PAIN;SURGICAL PAIN
TYPE: ACUTE PAIN

## 2024-12-20 NOTE — PROGRESS NOTES
0730- Assessment completed. Fundus firm, lochia light. Abd. incisions with mepilex silver dressing intact. Plan of care reviewed. Denies pain at this time, will call if pain med intervention needed. Call light within reach, bed at lowest position, and shows no signs of distress at this time.    1107- Notified Dr. Morley regarding patient's high blood pressure. No new orders at this time.    1153- Discharge education and follow up information reviewed who verbalizes understanding and papers were signed. Prescribed meds and information provided, patient verbalized understanding.     1348- Patient left facility escorted by staff.

## 2024-12-20 NOTE — CARE PLAN
The patient is Stable - Low risk of patient condition declining or worsening    Shift Goals  Clinical Goals: VSS, lochia WDL, BP control  Patient Goals: go home  Family Goals: support    Progress made toward(s) clinical / shift goals:    Problem: Knowledge Deficit - Postpartum  Goal: Patient will verbalize and demonstrate understanding of self and infant care  Outcome: Progressing     Problem: Psychosocial - Postpartum  Goal: Patient will verbalize and demonstrate effective bonding and parenting behavior  Outcome: Progressing       Patient is not progressing towards the following goals:

## 2024-12-20 NOTE — DISCHARGE INSTRUCTIONS

## 2024-12-20 NOTE — PROGRESS NOTES
"POD 3---  , salpingectomy    Improving in all regards!  Feels \"fine\", no opioids needed, potassium up, Hgb up, tolerating anemia well.    UO adequate  + flatus, tolerating regular diet well, denies N/V    LAB:      24 13:30 24 04:46 24 04:09 24 04:21   WBC 6.6 8.3 7.6 7.8   Hemoglobin 11.3 (L) 7.8 (L) 7.3 (L) 8.1 (L)   Hematocrit 34.1 (L) 23.6 (L) 22.8 (L) 24.9 (L)   Platelet Count 173 160 (L) 174 231      24 18:14 24 07:52 24 04:09 24 04:21   Potassium 3.2 (L) 2.9 (L) 2.8 (L) 3.3 (L)       PE:     Afebrile  -162/85-94---started nifedipine SR 30 mg/day, now 134/74!!!    Lungs clear to auscultation  Abdomen soft, flat, bowel sounds present and normal  Wound dressing in place, waterproof barrier intact  Calves nontender, Carrillo sign negative bilaterally  Back:  No CVA tenderness     PLAN: Postop care, analgesia as needed, diet as tolerated,  activity as tolerated. Patient planning on discharge:  today .    Prescriptions:       PNV  FoliTab 500 BID x 2 wks  KDur 20 meq BID x 2 wks  Nifedipine SR 30 mg/day  Ibuprofen 800 mg TID PRN  Tylenol PRN    Followup plans:   2 wks .    "

## 2024-12-20 NOTE — DISCHARGE SUMMARY
Discharge Summary:      Tina Crocker    Admit Date:   2024  Discharge Date:  2024     Admitting diagnosis:  Labor and delivery indication for care or intervention [O75.9]  Discharge Diagnosis: Status post  for repeat.  Pregnancy Complications:   OPERATIONS:  1.  Repeat low transverse  section.  2.  Bilateral partial salpingectomy.     SURGEON:  Joe Morley MD     ASSISTANT:  Julia Shanks MD     ANESTHESIOLOGIST:  Naima Carpenter MD     ANESTHESIA:  Spinal.     PREOPERATIVE DIAGNOSES:  1.  A 36 and 0/7th week gestation.  2.  Gestational hypertension with severe range blood pressures.  3.  Previous  section.  4.  Request for sterilization.     POSTOPERATIVE DIAGNOSES:  1.  A 36 and 0/7th week gestation.  2.  Gestational hypertension with severe range blood pressures.  3.  Previous  section.  4.  Request for sterilization.  5.  Pelvic and abdominal wall adhesions.  6.  Nuchal cord.     COMPLICATIONS:  None.     ESTIMATED BLOOD LOSS:  900 mL.     SPECIMENS SENT TO PATHOLOGY:  Left proximal fallopian tube and right distal   fallopian tube.     FINDINGS:  Baby--- female, 1 minute Apgar 8, 5 minute Apgar 9, weight 2540 grams.      Tubal Ligation:  Yes (bilateral partial salpingectomy)        History:  Past Medical History:   Diagnosis Date    Psychiatric problem 2022    depresson anxiety     OB History    Para Term  AB Living   3 2   1   1   SAB IAB Ectopic Molar Multiple Live Births           0 1      # Outcome Date GA Lbr Edgar/2nd Weight Sex Type Anes PTL Lv   3  24 36w0d  2.54 kg (5 lb 9.6 oz) F CS-LTranv Spinal N [place]   2             1 Para                 Patient has no known allergies.  Patient Active Problem List    Diagnosis Date Noted    Uterine scar from previous  delivery 2024    Request for sterilization 2024    Anemia associated with acute blood loss 2024    Hypokalemia due to  inadequate potassium intake 2024    Gestational hypertension 2024    Nuchal cord affecting delivery 2024    Labor and delivery indication for care or intervention 2024    Pelvic pain 2022    Pelvic peritoneal adhesions, female 2022    Obstruction of right fallopian tube 2022    Postoperative pain 2022    Postoperative nausea 2022        Hospital Course:   39 y.o G3, now para 2, was admitted with the above mentioned diagnosis, underwent ,  for repeat, bilateral salpingectomy. Patient postpartum course was   complicated by :  blood-loss anemia, tolerated well; hypokalemia, corrected with oral replacement; postpartum hypertension treated with nifedipine.  She had progressive advancement in diet , ambulation and toleration of oral analgesia. Patient without complaints today and desires discharge.      Vitals:    24 0039 24 0130 24 0529   BP: (!) 140/91 (!) 162/82 130/84 134/74   Pulse: 94 93 96 92   Resp: 18 16 16 16   Temp: 36.8 °C (98.3 °F) 36.8 °C (98.2 °F) 37 °C (98.6 °F) 36.6 °C (97.8 °F)   TempSrc: Temporal Temporal Temporal Temporal   SpO2: 97% 96% 96% 95%   Weight:       Height:           Current Facility-Administered Medications   Medication Dose    potassium chloride SA (Kdur) tablet 20 mEq  20 mEq    NIFEdipine SR (Procadia-XL) tablet 30 mg  30 mg    ferrous sulfate-c-folic acid (Folitab 500) tablet 1 Tablet  1 Tablet    oxytocin (Pitocin) infusion (for post delivery)  125 mL/hr    oxytocin (Pitocin) injection 10 Units  10 Units    lactated ringers infusion  2,000 mL    ibuprofen (Motrin) tablet 800 mg  800 mg    acetaminophen (Tylenol) tablet 1,000 mg  1,000 mg    oxyCODONE immediate-release (Roxicodone) tablet 5 mg  5 mg    oxyCODONE immediate release (Roxicodone) tablet 10 mg  10 mg    ondansetron (Zofran) syringe/vial injection 4 mg  4 mg    Or    ondansetron (Zofran ODT) dispertab 4 mg  4 mg    diphenhydrAMINE  (Benadryl) tablet/capsule 25 mg  25 mg    Or    diphenhydrAMINE (Benadryl) injection 25 mg  25 mg    docusate sodium (Colace) capsule 100 mg  100 mg    tetanus-dipth-acell pertussis (Tdap) inj 0.5 mL  0.5 mL    measles, mumps and rubella vaccine (Mmr) injection 0.5 mL  0.5 mL    metoclopramide (Reglan) injection 10 mg  10 mg    NIFEdipine IR (Procardia) capsule 10 mg  10 mg    magnesium hydroxide (Milk Of Magnesia) suspension 30 mL  30 mL    prenatal plus vitamin (Stuartnatal 1+1) 27-1 MG tablet 1 Tablet  1 Tablet    simethicone (Mylicon) chewable tablet 125 mg  125 mg    calcium carbonate (Tums) chewable tab 1,000 mg  1,000 mg       Exam:  Breast Exam: Inspection negative. No nipple discharge or bleeding. No masses or nodularity palpable  Abdomen: Abdomen soft, non-tender. BS normal. No masses,  No organomegaly  Fundus Non Tender: yes  Incision: no evidence of infection, separation or keloid formation.  Perineum: perineum intact  Extremity: extremities, peripheral pulses and reflexes normal     Labs:  Recent Labs     12/18/24  0446 12/19/24  0409 12/20/24  0421   WBC 8.3 7.6 7.8   RBC 2.68* 2.63* 2.82*   HEMOGLOBIN 7.8* 7.3* 8.1*   HEMATOCRIT 23.6* 22.8* 24.9*   MCV 88.1 86.7 88.3   MCH 29.1 27.8 28.7   MCHC 33.1 32.0* 32.5   RDW 45.1 44.3 45.9   PLATELETCT 160* 174 231   MPV 12.9 12.3 12.4        Activity:   Discharge to home  Pelvic Rest x 6 weeks    Assessment:  Course complicated by blood-loss anemia, tolerated well; hypokalemia, corrected with oral replacement; postpartum hypertension  Discharge Assessment: No areas of skin breakdown/redness; surgical incision intact/healing     Follow up: .Dr Morley, 2 weeks      Discharge Meds:   Current Outpatient Medications   Medication Sig Dispense Refill    acetaminophen (TYLENOL) 500 MG Tab Take 1-2 Tablets by mouth every 6 hours as needed for Mild Pain or Moderate Pain.      ferrous sulfate-c-folic acid (FOLITAB 500) 105-500-0.8 MG Tab CR Take 1 Tablet by mouth 2  times a day with meals for 15 days. 30 Tablet 0    ibuprofen (MOTRIN) 800 MG Tab Take 1 Tablet by mouth every 8 hours as needed for Mild Pain or Moderate Pain for up to 7 days. 21 Tablet 0    NIFEdipine SR (PROCADIA-XL) 30 MG tablet Take 1 Tablet by mouth every day for 30 days. 30 Tablet 0    potassium chloride SA (KDUR) 20 MEQ Tab CR Take 1 Tablet by mouth 2 times a day for 15 days. 30 Tablet 0       Joe Morley M.D.

## 2025-07-22 ENCOUNTER — HOSPITAL ENCOUNTER (OUTPATIENT)
Facility: MEDICAL CENTER | Age: 40
End: 2025-07-22
Attending: OBSTETRICS & GYNECOLOGY
Payer: COMMERCIAL

## 2025-07-29 ENCOUNTER — HOSPITAL ENCOUNTER (OUTPATIENT)
Dept: LAB | Facility: MEDICAL CENTER | Age: 40
End: 2025-07-29
Attending: OBSTETRICS & GYNECOLOGY
Payer: COMMERCIAL

## 2025-07-29 LAB
HPV I/H RISK 1 DNA SPEC QL NAA+PROBE: NOT DETECTED
SPECIMEN SOURCE: NORMAL
T4 FREE SERPL-MCNC: 1.07 NG/DL (ref 0.93–1.7)
TSH SERPL-ACNC: 0.6 UIU/ML (ref 0.38–5.33)

## 2025-07-29 PROCEDURE — 36415 COLL VENOUS BLD VENIPUNCTURE: CPT

## 2025-07-29 PROCEDURE — 84443 ASSAY THYROID STIM HORMONE: CPT

## 2025-07-29 PROCEDURE — 84439 ASSAY OF FREE THYROXINE: CPT

## 2025-07-30 LAB — THINPREP PAP, CYTOLOGY NL11781: NORMAL

## (undated) DEVICE — BARRIER ADH SEPRAFILM 4 SECTION (10/BX)

## (undated) DEVICE — GLOVE BIOGEL SZ 7 SURGICAL PF LTX - (50PR/BX 4BX/CA)

## (undated) DEVICE — GLOVE BIOGEL PI INDICATOR SZ 7.0 SURGICAL PF LF - (50/BX 4BX/CA)

## (undated) DEVICE — PAD SANITARY 11IN MAXI IND WRAPPED  (12EA/PK 24PK/CA)

## (undated) DEVICE — GLOVE BIOGEL PI INDICATOR SZ 6.5 SURGICAL PF LF - (50/BX 4BX/CA)

## (undated) DEVICE — DRAPESURG STERI-DRAPE LONG - (10/BX 4BX/CA)

## (undated) DEVICE — SUTURE 0 36IN PDS + VIO CT-1 (36PK/BX)

## (undated) DEVICE — CANISTER SUCTION RIGID RED 1500CC (40EA/CA)

## (undated) DEVICE — SUTURE 0 VICRYL PLUS CT-2 - 27 INCH (36/BX)

## (undated) DEVICE — SUTURE GENERAL

## (undated) DEVICE — DRESSING POST OP BORDER 4 X 10 (5EA/BX)

## (undated) DEVICE — TUBING CLEARLINK DUO-VENT - C-FLO (48EA/CA)

## (undated) DEVICE — SET LEADWIRE 5 LEAD BEDSIDE DISPOSABLE ECG (1SET OF 5/EA)

## (undated) DEVICE — SUTURE 4-0 VICRYL PLUS FS-2 - 27 INCH (36/BX)

## (undated) DEVICE — SET SUCTION/IRRIGATION WITH DISPOSABLE TIP (6/CA )PART #0250-070-520 IS A SUB

## (undated) DEVICE — GOWN SURGICAL XX-LARGE - (28EA/CA) SIRUS NON REINFORCED

## (undated) DEVICE — UTERINE MANIP RUMI 6.7X6 - (5/BX)

## (undated) DEVICE — DERMABOND ADVANCED - (12EA/BX)

## (undated) DEVICE — GLOVE SZ 7.5 BIOGEL PI MICRO - PF LF (50PR/BX)

## (undated) DEVICE — CATHETER IV NON-SAFETY 18 GA X 1 1/4 (50/BX 4BX/CA)

## (undated) DEVICE — SUTURE 0 VICRYL PLUS CT-1 - 36 INCH (36/BX)

## (undated) DEVICE — BLANKET UNDERBODY FULL ACCES - (5/CA)

## (undated) DEVICE — CLOSURE SKIN STRIP 1/2 X 4 IN - (STERI STRIP) (50/BX 4BX/CA)

## (undated) DEVICE — GLOVE BIOGEL SZ 9 SURGICAL PF LTX - (50/BX 4BX/CA)

## (undated) DEVICE — GLOVE BIOGEL SZ 8.5 SURGICAL PF LTX - (50PR/BX 4BX/CA)

## (undated) DEVICE — MASK OXYGEN VNYL ADLT MED CONC WITH 7 FOOT TUBING  - (50EA/CA)

## (undated) DEVICE — SODIUM CHL IRRIGATION 0.9% 1000ML (12EA/CA)

## (undated) DEVICE — CANNULA W/ SUPPLY TUBING O2 - (50/CA)

## (undated) DEVICE — TRAY FOLEY CATHETER STATLOCK 16FR SURESTEP  (10EA/CA)

## (undated) DEVICE — SLEEVE VASO DVT COMPRESSION CALF MED - (10PR/CA)

## (undated) DEVICE — CANISTER SUCTION 3000ML MECHANICAL FILTER AUTO SHUTOFF MEDI-VAC NONSTERILE LF DISP  (40EA/CA)

## (undated) DEVICE — TUBE E-T HI-LO CUFF 7.0MM (10EA/PK)

## (undated) DEVICE — TROCAR 5X100 SEPARTATOR ADV - FIXATION (6/BX)

## (undated) DEVICE — TOWEL STOP TIMEOUT SAFETY FLAG (40EA/CA)

## (undated) DEVICE — ELECTRODE DUAL RETURN W/ CORD - (50/PK)

## (undated) DEVICE — CANISTER SUCTION 3000ML MECHANICAL FILTER AUTO SHUTOFF MEDI-VAC NONSTERILE LF DISP (40EA/CA)

## (undated) DEVICE — LACTATED RINGERS INJ 1000 ML - (14EA/CA 60CA/PF)

## (undated) DEVICE — SET EXTENSION WITH 2 PORTS (48EA/CA) ***PART #2C8610 IS A SUBSTITUTE*****

## (undated) DEVICE — SUCTION INSTRUMENT YANKAUER BULBOUS TIP W/O VENT (50EA/CA)

## (undated) DEVICE — GOWN WARMING STANDARD FLEX - (30/CA)

## (undated) DEVICE — TRAY SPINAL ANESTHESIA NON-SAFETY (10/CA)

## (undated) DEVICE — STAPLER SKIN INSORB (6EA/BX)

## (undated) DEVICE — SUTURE 2-0 VICRYL PLUS CT-1 36 (36PK/BX)"

## (undated) DEVICE — SLEEVE VASO CALF MED - (10PR/CA)

## (undated) DEVICE — PACK C-SECTION (2EA/CA)

## (undated) DEVICE — WATER IRRIGATION STERILE 1000ML (12EA/CA)

## (undated) DEVICE — GLOVE BIOGEL PI INDICATOR SZ 8.0 SURGICAL PF LF -(50/BX 4BX/CA)

## (undated) DEVICE — TUBE CONNECTING SUCTION - CLEAR PLASTIC STERILE 72 IN (50EA/CA)

## (undated) DEVICE — CANISTER SUCTION 1200 CC MECHANICAL FILTER AUTO SHUT OFF MEDI-VAC STERILE LF DISP - (40EA/CA)

## (undated) DEVICE — KIT  I.V. START (100EA/CA)

## (undated) DEVICE — SUTURE 3-0 VICRYL CT-1 ABS 36IN

## (undated) DEVICE — TROCAR Z THREAD 11 X 100 - BLADED (6/BX)

## (undated) DEVICE — NEEDLE INSFL 120MM 14GA VRRS - (20/BX)

## (undated) DEVICE — CHLORAPREP 26 ML APPLICATOR - ORANGE TINT(25/CA)

## (undated) DEVICE — SYRINGE NON SAFETY 3 CC 21 GA X 1 1/2 IN (100/BX 8BX/CA)

## (undated) DEVICE — GLOVE SZ 7 BIOGEL PI MICRO - PF LF (50PR/BX 4BX/CA)

## (undated) DEVICE — HEAD HOLDER JUNIOR/ADULT

## (undated) DEVICE — SENSOR OXIMETER ADULT SPO2 RD SET (20EA/BX)

## (undated) DEVICE — LIGASURE SM JAW SEALER CRVD - (6EA/CA)

## (undated) DEVICE — PLUMEPEN ULTRA 3/8 IN X 10 FT HOSE (20EA/CA)

## (undated) DEVICE — Device